# Patient Record
Sex: FEMALE | Race: WHITE | Employment: UNEMPLOYED | URBAN - METROPOLITAN AREA
[De-identification: names, ages, dates, MRNs, and addresses within clinical notes are randomized per-mention and may not be internally consistent; named-entity substitution may affect disease eponyms.]

---

## 2019-05-27 ENCOUNTER — HOSPITAL ENCOUNTER (INPATIENT)
Age: 54
LOS: 4 days | Discharge: HOME | DRG: 201 | End: 2019-05-31
Attending: EMERGENCY MEDICINE | Admitting: INTERNAL MEDICINE
Payer: MEDICAID

## 2019-05-27 ENCOUNTER — APPOINTMENT (OUTPATIENT)
Dept: CT IMAGING | Age: 54
DRG: 201 | End: 2019-05-27
Payer: MEDICAID

## 2019-05-27 ENCOUNTER — APPOINTMENT (OUTPATIENT)
Dept: GENERAL RADIOLOGY | Age: 54
DRG: 201 | End: 2019-05-27
Payer: MEDICAID

## 2019-05-27 DIAGNOSIS — R55 NEAR SYNCOPE: ICD-10-CM

## 2019-05-27 DIAGNOSIS — K29.00 ACUTE SUPERFICIAL GASTRITIS WITHOUT HEMORRHAGE: ICD-10-CM

## 2019-05-27 DIAGNOSIS — I48.91 ATRIAL FIBRILLATION WITH RAPID VENTRICULAR RESPONSE (HCC): ICD-10-CM

## 2019-05-27 DIAGNOSIS — I48.91 NEW ONSET A-FIB (HCC): Primary | ICD-10-CM

## 2019-05-27 LAB
A/G RATIO: 1 (ref 1.1–2.2)
ALBUMIN SERPL-MCNC: 3.8 G/DL (ref 3.4–5)
ALP BLD-CCNC: 109 U/L (ref 40–129)
ALT SERPL-CCNC: 22 U/L (ref 10–40)
ANION GAP SERPL CALCULATED.3IONS-SCNC: 15 MMOL/L (ref 3–16)
AST SERPL-CCNC: 17 U/L (ref 15–37)
BACTERIA: ABNORMAL /HPF
BASOPHILS ABSOLUTE: 0.1 K/UL (ref 0–0.2)
BASOPHILS RELATIVE PERCENT: 0.4 %
BILIRUB SERPL-MCNC: 0.8 MG/DL (ref 0–1)
BILIRUBIN URINE: NEGATIVE
BLOOD, URINE: NEGATIVE
BUN BLDV-MCNC: 16 MG/DL (ref 7–20)
CALCIUM SERPL-MCNC: 10 MG/DL (ref 8.3–10.6)
CHLORIDE BLD-SCNC: 95 MMOL/L (ref 99–110)
CLARITY: CLEAR
CO2: 23 MMOL/L (ref 21–32)
COLOR: YELLOW
CREAT SERPL-MCNC: <0.5 MG/DL (ref 0.6–1.1)
EKG ATRIAL RATE: 138 BPM
EKG DIAGNOSIS: NORMAL
EKG Q-T INTERVAL: 286 MS
EKG QRS DURATION: 76 MS
EKG QTC CALCULATION (BAZETT): 438 MS
EKG R AXIS: 75 DEGREES
EKG T AXIS: 81 DEGREES
EKG VENTRICULAR RATE: 141 BPM
EOSINOPHILS ABSOLUTE: 0 K/UL (ref 0–0.6)
EOSINOPHILS RELATIVE PERCENT: 0.1 %
EPITHELIAL CELLS, UA: ABNORMAL /HPF
GFR AFRICAN AMERICAN: >60
GFR NON-AFRICAN AMERICAN: >60
GLOBULIN: 3.7 G/DL
GLUCOSE BLD-MCNC: 146 MG/DL (ref 70–99)
GLUCOSE URINE: NEGATIVE MG/DL
HCT VFR BLD CALC: 47.5 % (ref 36–48)
HEMOGLOBIN: 16.3 G/DL (ref 12–16)
KETONES, URINE: NEGATIVE MG/DL
LEUKOCYTE ESTERASE, URINE: ABNORMAL
LYMPHOCYTES ABSOLUTE: 2.9 K/UL (ref 1–5.1)
LYMPHOCYTES RELATIVE PERCENT: 20.7 %
MCH RBC QN AUTO: 29.4 PG (ref 26–34)
MCHC RBC AUTO-ENTMCNC: 34.3 G/DL (ref 31–36)
MCV RBC AUTO: 85.6 FL (ref 80–100)
MICROSCOPIC EXAMINATION: YES
MONOCYTES ABSOLUTE: 1.2 K/UL (ref 0–1.3)
MONOCYTES RELATIVE PERCENT: 8.8 %
MUCUS: ABNORMAL /LPF
NEUTROPHILS ABSOLUTE: 9.9 K/UL (ref 1.7–7.7)
NEUTROPHILS RELATIVE PERCENT: 70 %
NITRITE, URINE: NEGATIVE
PDW BLD-RTO: 12.8 % (ref 12.4–15.4)
PH UA: 6.5 (ref 5–8)
PLATELET # BLD: 225 K/UL (ref 135–450)
PMV BLD AUTO: 10.1 FL (ref 5–10.5)
POTASSIUM REFLEX MAGNESIUM: 3.8 MMOL/L (ref 3.5–5.1)
PROTEIN UA: NEGATIVE MG/DL
RBC # BLD: 5.55 M/UL (ref 4–5.2)
RBC UA: ABNORMAL /HPF (ref 0–2)
SODIUM BLD-SCNC: 133 MMOL/L (ref 136–145)
SPECIFIC GRAVITY UA: <=1.005 (ref 1–1.03)
TOTAL PROTEIN: 7.5 G/DL (ref 6.4–8.2)
TROPONIN: <0.01 NG/ML
URINE REFLEX TO CULTURE: YES
URINE TYPE: ABNORMAL
UROBILINOGEN, URINE: 0.2 E.U./DL
WBC # BLD: 14.1 K/UL (ref 4–11)
WBC UA: ABNORMAL /HPF (ref 0–5)

## 2019-05-27 PROCEDURE — 93005 ELECTROCARDIOGRAM TRACING: CPT | Performed by: EMERGENCY MEDICINE

## 2019-05-27 PROCEDURE — 93005 ELECTROCARDIOGRAM TRACING: CPT | Performed by: NURSE PRACTITIONER

## 2019-05-27 PROCEDURE — 81001 URINALYSIS AUTO W/SCOPE: CPT

## 2019-05-27 PROCEDURE — 96372 THER/PROPH/DIAG INJ SC/IM: CPT

## 2019-05-27 PROCEDURE — 6360000002 HC RX W HCPCS: Performed by: EMERGENCY MEDICINE

## 2019-05-27 PROCEDURE — 6360000002 HC RX W HCPCS

## 2019-05-27 PROCEDURE — 85025 COMPLETE CBC W/AUTO DIFF WBC: CPT

## 2019-05-27 PROCEDURE — 6360000002 HC RX W HCPCS: Performed by: NURSE PRACTITIONER

## 2019-05-27 PROCEDURE — 96365 THER/PROPH/DIAG IV INF INIT: CPT

## 2019-05-27 PROCEDURE — 96376 TX/PRO/DX INJ SAME DRUG ADON: CPT

## 2019-05-27 PROCEDURE — 2580000003 HC RX 258: Performed by: INTERNAL MEDICINE

## 2019-05-27 PROCEDURE — 99285 EMERGENCY DEPT VISIT HI MDM: CPT

## 2019-05-27 PROCEDURE — 93010 ELECTROCARDIOGRAM REPORT: CPT | Performed by: INTERNAL MEDICINE

## 2019-05-27 PROCEDURE — 2500000003 HC RX 250 WO HCPCS: Performed by: EMERGENCY MEDICINE

## 2019-05-27 PROCEDURE — 87086 URINE CULTURE/COLONY COUNT: CPT

## 2019-05-27 PROCEDURE — 80053 COMPREHEN METABOLIC PANEL: CPT

## 2019-05-27 PROCEDURE — 6360000002 HC RX W HCPCS: Performed by: INTERNAL MEDICINE

## 2019-05-27 PROCEDURE — 71046 X-RAY EXAM CHEST 2 VIEWS: CPT

## 2019-05-27 PROCEDURE — 74177 CT ABD & PELVIS W/CONTRAST: CPT

## 2019-05-27 PROCEDURE — 96375 TX/PRO/DX INJ NEW DRUG ADDON: CPT

## 2019-05-27 PROCEDURE — 2500000003 HC RX 250 WO HCPCS

## 2019-05-27 PROCEDURE — 6360000004 HC RX CONTRAST MEDICATION: Performed by: NURSE PRACTITIONER

## 2019-05-27 PROCEDURE — 84484 ASSAY OF TROPONIN QUANT: CPT

## 2019-05-27 PROCEDURE — 1200000000 HC SEMI PRIVATE

## 2019-05-27 PROCEDURE — 96361 HYDRATE IV INFUSION ADD-ON: CPT

## 2019-05-27 PROCEDURE — 2580000003 HC RX 258: Performed by: NURSE PRACTITIONER

## 2019-05-27 RX ORDER — ONDANSETRON 2 MG/ML
4 INJECTION INTRAMUSCULAR; INTRAVENOUS ONCE
Status: COMPLETED | OUTPATIENT
Start: 2019-05-27 | End: 2019-05-27

## 2019-05-27 RX ORDER — SODIUM CHLORIDE 0.9 % (FLUSH) 0.9 %
10 SYRINGE (ML) INJECTION EVERY 12 HOURS SCHEDULED
Status: DISCONTINUED | OUTPATIENT
Start: 2019-05-27 | End: 2019-05-31 | Stop reason: HOSPADM

## 2019-05-27 RX ORDER — OXCARBAZEPINE 150 MG/1
400 TABLET, FILM COATED ORAL DAILY
Status: DISCONTINUED | OUTPATIENT
Start: 2019-05-28 | End: 2019-05-31 | Stop reason: HOSPADM

## 2019-05-27 RX ORDER — ALPRAZOLAM 0.25 MG/1
0.5 TABLET ORAL NIGHTLY PRN
Status: DISCONTINUED | OUTPATIENT
Start: 2019-05-28 | End: 2019-05-31 | Stop reason: HOSPADM

## 2019-05-27 RX ORDER — SODIUM CHLORIDE 9 MG/ML
INJECTION, SOLUTION INTRAVENOUS CONTINUOUS
Status: DISCONTINUED | OUTPATIENT
Start: 2019-05-27 | End: 2019-05-31 | Stop reason: HOSPADM

## 2019-05-27 RX ORDER — PROMETHAZINE HYDROCHLORIDE 25 MG/ML
6.25 INJECTION, SOLUTION INTRAMUSCULAR; INTRAVENOUS EVERY 6 HOURS PRN
Status: DISCONTINUED | OUTPATIENT
Start: 2019-05-27 | End: 2019-05-31 | Stop reason: HOSPADM

## 2019-05-27 RX ORDER — CITALOPRAM 20 MG/1
20 TABLET ORAL DAILY
Status: DISCONTINUED | OUTPATIENT
Start: 2019-05-28 | End: 2019-05-31 | Stop reason: HOSPADM

## 2019-05-27 RX ORDER — 0.9 % SODIUM CHLORIDE 0.9 %
1000 INTRAVENOUS SOLUTION INTRAVENOUS ONCE
Status: COMPLETED | OUTPATIENT
Start: 2019-05-27 | End: 2019-05-27

## 2019-05-27 RX ORDER — MORPHINE SULFATE 4 MG/ML
4 INJECTION, SOLUTION INTRAMUSCULAR; INTRAVENOUS ONCE
Status: COMPLETED | OUTPATIENT
Start: 2019-05-27 | End: 2019-05-27

## 2019-05-27 RX ORDER — DEXTROAMPHETAMINE SACCHARATE, AMPHETAMINE ASPARTATE, DEXTROAMPHETAMINE SULFATE AND AMPHETAMINE SULFATE 2.5; 2.5; 2.5; 2.5 MG/1; MG/1; MG/1; MG/1
20 TABLET ORAL DAILY
Status: DISCONTINUED | OUTPATIENT
Start: 2019-05-28 | End: 2019-05-30

## 2019-05-27 RX ORDER — 0.9 % SODIUM CHLORIDE 0.9 %
1000 INTRAVENOUS SOLUTION INTRAVENOUS ONCE
Status: COMPLETED | OUTPATIENT
Start: 2019-05-27 | End: 2019-05-28

## 2019-05-27 RX ORDER — ONDANSETRON 2 MG/ML
INJECTION INTRAMUSCULAR; INTRAVENOUS
Status: COMPLETED
Start: 2019-05-27 | End: 2019-05-27

## 2019-05-27 RX ORDER — ONDANSETRON 2 MG/ML
4 INJECTION INTRAMUSCULAR; INTRAVENOUS EVERY 6 HOURS PRN
Status: COMPLETED | OUTPATIENT
Start: 2019-05-27 | End: 2019-05-27

## 2019-05-27 RX ORDER — DILTIAZEM HYDROCHLORIDE 5 MG/ML
10 INJECTION INTRAVENOUS ONCE
Status: COMPLETED | OUTPATIENT
Start: 2019-05-27 | End: 2019-05-27

## 2019-05-27 RX ORDER — MORPHINE SULFATE 4 MG/ML
4 INJECTION, SOLUTION INTRAMUSCULAR; INTRAVENOUS EVERY 4 HOURS PRN
Status: DISCONTINUED | OUTPATIENT
Start: 2019-05-27 | End: 2019-05-31 | Stop reason: HOSPADM

## 2019-05-27 RX ORDER — SODIUM CHLORIDE 0.9 % (FLUSH) 0.9 %
10 SYRINGE (ML) INJECTION PRN
Status: DISCONTINUED | OUTPATIENT
Start: 2019-05-27 | End: 2019-05-31 | Stop reason: HOSPADM

## 2019-05-27 RX ORDER — ACETAMINOPHEN 325 MG/1
650 TABLET ORAL EVERY 4 HOURS PRN
Status: DISCONTINUED | OUTPATIENT
Start: 2019-05-27 | End: 2019-05-31 | Stop reason: HOSPADM

## 2019-05-27 RX ADMIN — MORPHINE SULFATE 4 MG: 4 INJECTION INTRAVENOUS at 16:48

## 2019-05-27 RX ADMIN — MORPHINE SULFATE 4 MG: 4 INJECTION INTRAVENOUS at 23:43

## 2019-05-27 RX ADMIN — ONDANSETRON 4 MG: 2 INJECTION INTRAMUSCULAR; INTRAVENOUS at 16:48

## 2019-05-27 RX ADMIN — ENOXAPARIN SODIUM 50 MG: 60 INJECTION SUBCUTANEOUS at 21:27

## 2019-05-27 RX ADMIN — Medication 10 ML: at 23:49

## 2019-05-27 RX ADMIN — ONDANSETRON 4 MG: 2 INJECTION INTRAMUSCULAR; INTRAVENOUS at 21:48

## 2019-05-27 RX ADMIN — SODIUM CHLORIDE 1000 ML: 9 INJECTION, SOLUTION INTRAVENOUS at 16:48

## 2019-05-27 RX ADMIN — SODIUM CHLORIDE 1000 ML: 9 INJECTION, SOLUTION INTRAVENOUS at 23:46

## 2019-05-27 RX ADMIN — SODIUM CHLORIDE 1000 ML: 9 INJECTION, SOLUTION INTRAVENOUS at 18:01

## 2019-05-27 RX ADMIN — IOPAMIDOL 75 ML: 755 INJECTION, SOLUTION INTRAVENOUS at 17:09

## 2019-05-27 RX ADMIN — DILTIAZEM HYDROCHLORIDE 10 MG: 5 INJECTION INTRAVENOUS at 21:27

## 2019-05-27 RX ADMIN — ONDANSETRON 4 MG: 2 INJECTION INTRAMUSCULAR; INTRAVENOUS at 23:42

## 2019-05-27 RX ADMIN — CEFTRIAXONE SODIUM 1 G: 1 INJECTION, POWDER, FOR SOLUTION INTRAMUSCULAR; INTRAVENOUS at 19:02

## 2019-05-27 RX ADMIN — FAMOTIDINE 20 MG: 10 INJECTION INTRAVENOUS at 21:48

## 2019-05-27 ASSESSMENT — PAIN SCALES - GENERAL
PAINLEVEL_OUTOF10: 10
PAINLEVEL_OUTOF10: 9
PAINLEVEL_OUTOF10: 8
PAINLEVEL_OUTOF10: 7
PAINLEVEL_OUTOF10: 9

## 2019-05-27 ASSESSMENT — PAIN DESCRIPTION - PAIN TYPE
TYPE: ACUTE PAIN
TYPE: ACUTE PAIN

## 2019-05-27 ASSESSMENT — PAIN DESCRIPTION - LOCATION
LOCATION: ABDOMEN
LOCATION: ABDOMEN

## 2019-05-27 NOTE — ED NOTES
Patient ambulate in ed hallway. She walk just fine. She said she feels a little light head.       Karly Negrete  05/27/19 1952

## 2019-05-27 NOTE — ED PROVIDER NOTES
[facility]  EMERGENCY DEPARTMENT ENCOUNTER        Pt Name: Angela Rosa  MRN: [id number]  [place] 1965  Date of evaluation: 5/27/2019  Provider: ANALIA Peña CNP  PCP: No primary care provider on file. This patient was seen and evaluated by the attending physician Dr. Pernell Urbina       Chief Complaint   Patient presents with    Loss of Consciousness     Pt states she passed out today hasnt been feelig well not eating x 3 days        HISTORY OF PRESENT ILLNESS   (Location/Symptom, Timing/Onset, Context/Setting, Quality, Duration, Modifying Factors, Severity)  Note limiting factors. Angela Rosa is a 46 y.o. female who has not felt well for the past 3-5 days. She states that on Friday of last week she vomited for nine hours straight. She had three episode of diarrhea today, but denies any blood in her stool. She states she has not eaten in three days and just feels weak. She denies any urinary symptoms. She denies any fever, but has had chills. She is having abdominal pain all over, feels like every muscle \"in my stomach has been pulled out\". She denies taking any medications for her symptoms. she is also complaining of generalized body aches. she is mostly complaining of diffuse abdominal pain and tenderness, rates the pain a 10 out of 10. Denies any additional complaints. Denies taking any medicine for her symptoms. No additional aggravating or alleviating factors. The patient presents awake, alert and in no acute respiratory distress or toxic appearance however, she does appear to be weak and fatigued. Nursing Notes were all reviewed and agreed with or any disagreements were addressed  in the HPI. REVIEW OF SYSTEMS    (2-9 systems for level 4, 10 or more for level 5)     Review of Systems   Constitutional: Negative for chills and fever. HENT: Negative for congestion.     Respiratory: Negative for cough, shortness of breath and wheezing. Cardiovascular: Negative for chest pain. Gastrointestinal: Positive for abdominal pain, diarrhea, nausea and vomiting. Patient whether generalized abdominal cramping associated with nausea vomiting or diarrhea, no blood in stool. Genitourinary: Negative for difficulty urinating, dysuria and frequency. Musculoskeletal: Positive for myalgias. Negative for back pain. Skin: Negative for color change. Neurological: Negative for weakness, numbness and headaches. Positives and Pertinent negatives as per HPI. Except as noted abovein the ROS, all other systems were reviewed and negative. PAST MEDICAL HISTORY     Past Medical History:   Diagnosis Date    Depression          SURGICAL HISTORY     Past Surgical History:   Procedure Laterality Date    ANKLE SURGERY      ANKLE SURGERY      MOUTH SURGERY      WISDOM TOOTH EXTRACTION           [address]       Current Discharge Medication List      CONTINUE these medications which have NOT CHANGED    Details   amphetamine-dextroamphetamine (ADDERALL) 20 MG tablet Take 20 mg by mouth daily      citalopram (CELEXA) 20 MG tablet Take 20 mg by mouth daily      ALPRAZolam (XANAX) 0.5 MG tablet Take 0.5 mg by mouth nightly as needed for Sleep      OXcarbazepine (TRILEPTAL) 150 MG tablet Take 400 mg by mouth daily                ALLERGIES     Aspirin; Codeine; Penicillins; and Poultry meal    FAMILYHISTORY     History reviewed. No pertinent family history.        SOCIAL HISTORY       Social History     Socioeconomic History    Marital status:      Spouse name: None    Number of children: None    Years of education: None    Highest education level: None   Occupational History    None   Social Needs    Financial resource strain: None    Food insecurity:     Worry: None     Inability: None    Transportation needs:     Medical: None     Non-medical: None   Tobacco Use    Smoking status: Current Every Day Smoker Packs/day: 0.50     Types: Cigarettes    Smokeless tobacco: Never Used   Substance and Sexual Activity    Alcohol use: Yes     Comment: occ    Drug use: Yes     Types: Marijuana    Sexual activity: None   Lifestyle    Physical activity:     Days per week: None     Minutes per session: None    Stress: None   Relationships    Social connections:     Talks on phone: None     Gets together: None     Attends Church service: None     Active member of club or organization: None     Attends meetings of clubs or organizations: None     Relationship status: None    Intimate partner violence:     Fear of current or ex partner: None     Emotionally abused: None     Physically abused: None     Forced sexual activity: None   Other Topics Concern    None   Social History Narrative    None       SCREENINGS    [place] Coma Scale  Eye Opening: Spontaneous  Best Verbal Response: Oriented  Best Motor Response: Obeys commands  [place] Coma Scale Score: 15        PHYSICAL EXAM    (up to 7 for level 4, 8 or more for level 5)     ED Triage Vitals [05/27/19 1534]   BP Temp Temp src Pulse Resp SpO2 Height Weight   (!) 125/96 98 °F (36.7 °C) -- 129 15 99 % 5' 1\" (1.549 m) 100 lb (45.4 kg)       Physical Exam   Constitutional: She is oriented to person, place, and time. She appears well-developed and well-nourished. HENT:   Head: Normocephalic. Right Ear: External ear normal.   Left Ear: External ear normal.   Mouth/Throat: Oropharynx is clear and moist.   Eyes: Right eye exhibits no discharge. Left eye exhibits no discharge. No scleral icterus. Neck: Normal range of motion. Neck supple. Cardiovascular:   Normal S1 and 2, peripheral pulses 2+, initially tachycardic   Pulmonary/Chest: Effort normal. No respiratory distress. Airway patent with symmetric rise and fall chest: Clear anteriorly, diminished posteriorly in the bases.   The patient is not tachypneic or dyspneic and saturations are 99% on room air   Abdominal: Soft. Bowel sounds are normal. There is no tenderness. Musculoskeletal: Normal range of motion. Neurological: She is alert and oriented to person, place, and time. GCS eye subscore is 4. GCS verbal subscore is 5. GCS motor subscore is 6. Patient is awake, alert following commands correctly. Neurologically intact no focal deficits. No numbness, tingling of paresthesias. Follows all commands correctly. Skin: Skin is warm. Capillary refill takes less than 2 seconds. She is not diaphoretic. No pallor. Psychiatric: She has a normal mood and affect. Her behavior is normal.   Nursing note and vitals reviewed.       DIAGNOSTIC RESULTS   LABS:    Labs Reviewed   CBC WITH AUTO DIFFERENTIAL - Abnormal; Notable for the following components:       Result Value    WBC 14.1 (*)     RBC 5.55 (*)     Hemoglobin 16.3 (*)     Neutrophils # 9.9 (*)     All other components within normal limits    Narrative:     Performed at:  [place] [address] [address]   Phone [phone]   COMPREHENSIVE METABOLIC PANEL W/ REFLEX TO MG FOR LOW K - Abnormal; Notable for the following components:    Sodium 133 (*)     Chloride 95 (*)     Glucose 146 (*)     CREATININE <0.5 (*)     Albumin/Globulin Ratio 1.0 (*)     All other components within normal limits    Narrative:     Performed at:  [place] [address] [address]   Phone [phone]   URINE RT REFLEX TO CULTURE - Abnormal; Notable for the following components:    Leukocyte Esterase, Urine TRACE (*)     All other components within normal limits    Narrative:     Performed at:  [place] [address] [address]   Phone [phone]   MICROSCOPIC URINALYSIS - Abnormal; Notable for the following components:    Mucus, UA 1+ (*)     WBC, UA 6-10 (*)     Bacteria, UA 1+ (*)     All other components within normal limits Narrative:     Performed at:  [facility]  [address] [facility] [employer] [place]   Phone [phone]   URINE CULTURE   TROPONIN    Narrative:     Performed at:  [facility]  [address] [facility] [place]   Phone [phone]   URINALYSIS   MAGNESIUM   PHOSPHORUS   TSH WITH REFLEX   BASIC METABOLIC PANEL W/ REFLEX TO MG FOR LOW K   CBC   LIPID PANEL       All other labs were within normal range or not returned as of this dictation. EKG: All EKG's are interpreted by the Emergency Department Physician who either signs orCo-signs this chart in the absence of a cardiologist.  Please see their note for interpretation of EKG. RADIOLOGY:   Non-plain film images such as CT, Ultrasound and MRI are read by the radiologist. Plain radiographic images are visualized andpreliminarily interpreted by the  ED Provider with the below findings:        Interpretation perthe Radiologist below, if available at the time of this note:    CT ABDOMEN PELVIS W IV CONTRAST Additional Contrast? None   Final Result   Marked mural thickening of the gastric body compatible with gastritis. Definite ulceration not identified, but that can be difficult to visualized   with CT technique. Consider direct visualization. There is fluid-filled distention of small bowel in the left mid upper   abdomen, with evidence of mucosal hyperenhancement. Consequently, component   of enteritis is also considered. There is poor visualization of the proximal celiac and superior mesenteric   arteries, which is probably technical in nature. Correlate with any clinical   evidence acute and/or chronic mesenteric ischemia. Consider further   evaluation with CT angiography to better evaluate those and to evaluate for   the possibility of any stenosis. XR CHEST STANDARD (2 VW)   Final Result   No acute process. No results found.        PROCEDURES   Unless iopamidol (ISOVUE-370) 76 % injection 75 mL (75 mLs Intravenous Given 5/27/19 1709)   0.9 % sodium chloride bolus (0 mLs Intravenous Stopped 5/27/19 2027)   cefTRIAXone (ROCEPHIN) 1 g IVPB in 50 mL D5W minibag (0 g Intravenous Stopped 5/27/19 2027)   diltiazem injection 10 mg (10 mg Intravenous Given 5/27/19 2127)   enoxaparin (LOVENOX) injection 50 mg (50 mg Subcutaneous Given 5/27/19 2127)   ondansetron (ZOFRAN) injection 4 mg (4 mg Intravenous Given 5/27/19 2148)   famotidine (PEPCID) injection 20 mg (20 mg Intravenous Given 5/27/19 2148)   ondansetron (ZOFRAN) injection 4 mg (4 mg Intravenous Given 5/27/19 2342)   Patient has not felt well for the past 3-5 days. She states that on Friday of last week she vomited for nine hours straight. She had three episode of diarrhea today, but denies any blood in her stool. She states she has not eaten in three days and just feels weak. She denies any urinary symptoms. She denies any fever, but has had chills. She is having abdominal pain all over, feels like every muscle \"in my stomach has been pulled out\". She denies taking any medications for her symptoms. After evaluation and examination patient I ordered IV access, IV fluids, blood work, chest x-ray, CT of the abdomen, pain and nausea medicine. Urinalysis shows 6-10 WBCs, appears to be a contaminated sample as she is 5-10 epithelial cells. Metabolic panel shows a leukocytosis with a white count of 14,100, no significant anemia. Metabolic panel shows no significant electrolyte disturbances or renal insufficiency. Troponin is negative. I did have the patient walk with the nursing staff initiated later well, orthostatic vital signs were also obtained,and signs of orthostatic hypotension. CT scan abdomen shows markedly mural thickening of the gastric body compatible with gastritis, a definitive ulceration not identified that can be difficult with CT.   I did give him a dose of Rocephin concerning for any white

## 2019-05-28 PROBLEM — E05.90 HYPERTHYROIDISM: Status: ACTIVE | Noted: 2019-05-28

## 2019-05-28 PROBLEM — E43 SEVERE MALNUTRITION (HCC): Chronic | Status: ACTIVE | Noted: 2019-05-28

## 2019-05-28 PROBLEM — I48.0 PAROXYSMAL ATRIAL FIBRILLATION (HCC): Status: ACTIVE | Noted: 2019-05-27

## 2019-05-28 LAB
ANION GAP SERPL CALCULATED.3IONS-SCNC: 8 MMOL/L (ref 3–16)
BUN BLDV-MCNC: 8 MG/DL (ref 7–20)
CALCIUM SERPL-MCNC: 8.3 MG/DL (ref 8.3–10.6)
CHLORIDE BLD-SCNC: 105 MMOL/L (ref 99–110)
CHOLESTEROL, TOTAL: 95 MG/DL (ref 0–199)
CO2: 24 MMOL/L (ref 21–32)
CREAT SERPL-MCNC: <0.5 MG/DL (ref 0.6–1.1)
EKG ATRIAL RATE: 441 BPM
EKG ATRIAL RATE: 86 BPM
EKG DIAGNOSIS: NORMAL
EKG DIAGNOSIS: NORMAL
EKG Q-T INTERVAL: 308 MS
EKG Q-T INTERVAL: 350 MS
EKG QRS DURATION: 78 MS
EKG QRS DURATION: 78 MS
EKG QTC CALCULATION (BAZETT): 424 MS
EKG QTC CALCULATION (BAZETT): 432 MS
EKG R AXIS: 70 DEGREES
EKG R AXIS: 75 DEGREES
EKG T AXIS: 67 DEGREES
EKG T AXIS: 84 DEGREES
EKG VENTRICULAR RATE: 114 BPM
EKG VENTRICULAR RATE: 92 BPM
GFR AFRICAN AMERICAN: >60
GFR NON-AFRICAN AMERICAN: >60
GLUCOSE BLD-MCNC: 106 MG/DL (ref 70–99)
HCG QUALITATIVE: NEGATIVE
HCT VFR BLD CALC: 36.3 % (ref 36–48)
HDLC SERPL-MCNC: 46 MG/DL (ref 40–60)
HEMOGLOBIN: 12.2 G/DL (ref 12–16)
LDL CHOLESTEROL CALCULATED: 38 MG/DL
MAGNESIUM: 1.5 MG/DL (ref 1.8–2.4)
MCH RBC QN AUTO: 28.6 PG (ref 26–34)
MCHC RBC AUTO-ENTMCNC: 33.6 G/DL (ref 31–36)
MCV RBC AUTO: 85.3 FL (ref 80–100)
PDW BLD-RTO: 12.7 % (ref 12.4–15.4)
PHOSPHORUS: 3.6 MG/DL (ref 2.5–4.9)
PLATELET # BLD: 154 K/UL (ref 135–450)
PMV BLD AUTO: 9.6 FL (ref 5–10.5)
POTASSIUM REFLEX MAGNESIUM: 3.2 MMOL/L (ref 3.5–5.1)
RBC # BLD: 4.26 M/UL (ref 4–5.2)
SODIUM BLD-SCNC: 137 MMOL/L (ref 136–145)
T4 FREE: 4.3 NG/DL (ref 0.9–1.8)
TRIGL SERPL-MCNC: 53 MG/DL (ref 0–150)
TSH REFLEX: <0.01 UIU/ML (ref 0.27–4.2)
VLDLC SERPL CALC-MCNC: 11 MG/DL
WBC # BLD: 11.4 K/UL (ref 4–11)

## 2019-05-28 PROCEDURE — 2500000003 HC RX 250 WO HCPCS: Performed by: INTERNAL MEDICINE

## 2019-05-28 PROCEDURE — 80061 LIPID PANEL: CPT

## 2019-05-28 PROCEDURE — 6360000002 HC RX W HCPCS: Performed by: INTERNAL MEDICINE

## 2019-05-28 PROCEDURE — 85027 COMPLETE CBC AUTOMATED: CPT

## 2019-05-28 PROCEDURE — 80048 BASIC METABOLIC PNL TOTAL CA: CPT

## 2019-05-28 PROCEDURE — 6370000000 HC RX 637 (ALT 250 FOR IP): Performed by: INTERNAL MEDICINE

## 2019-05-28 PROCEDURE — 93010 ELECTROCARDIOGRAM REPORT: CPT | Performed by: INTERNAL MEDICINE

## 2019-05-28 PROCEDURE — 93005 ELECTROCARDIOGRAM TRACING: CPT | Performed by: INTERNAL MEDICINE

## 2019-05-28 PROCEDURE — 2580000003 HC RX 258: Performed by: INTERNAL MEDICINE

## 2019-05-28 PROCEDURE — 93306 TTE W/DOPPLER COMPLETE: CPT

## 2019-05-28 PROCEDURE — 6360000002 HC RX W HCPCS: Performed by: NURSE PRACTITIONER

## 2019-05-28 PROCEDURE — 84439 ASSAY OF FREE THYROXINE: CPT

## 2019-05-28 PROCEDURE — 83735 ASSAY OF MAGNESIUM: CPT

## 2019-05-28 PROCEDURE — 84100 ASSAY OF PHOSPHORUS: CPT

## 2019-05-28 PROCEDURE — 1200000000 HC SEMI PRIVATE

## 2019-05-28 PROCEDURE — 84703 CHORIONIC GONADOTROPIN ASSAY: CPT

## 2019-05-28 PROCEDURE — 99254 IP/OBS CNSLTJ NEW/EST MOD 60: CPT | Performed by: INTERNAL MEDICINE

## 2019-05-28 PROCEDURE — 84443 ASSAY THYROID STIM HORMONE: CPT

## 2019-05-28 PROCEDURE — 36415 COLL VENOUS BLD VENIPUNCTURE: CPT

## 2019-05-28 PROCEDURE — 6370000000 HC RX 637 (ALT 250 FOR IP): Performed by: NURSE PRACTITIONER

## 2019-05-28 RX ORDER — POTASSIUM CHLORIDE 20 MEQ/1
40 TABLET, EXTENDED RELEASE ORAL
Status: COMPLETED | OUTPATIENT
Start: 2019-05-28 | End: 2019-05-28

## 2019-05-28 RX ORDER — PROPRANOLOL HYDROCHLORIDE 10 MG/1
20 TABLET ORAL 3 TIMES DAILY
Status: DISCONTINUED | OUTPATIENT
Start: 2019-05-28 | End: 2019-05-29

## 2019-05-28 RX ORDER — NICOTINE 21 MG/24HR
1 PATCH, TRANSDERMAL 24 HOURS TRANSDERMAL DAILY
Status: DISCONTINUED | OUTPATIENT
Start: 2019-05-28 | End: 2019-05-31 | Stop reason: HOSPADM

## 2019-05-28 RX ORDER — METHIMAZOLE 5 MG/1
5 TABLET ORAL 3 TIMES DAILY
Status: DISCONTINUED | OUTPATIENT
Start: 2019-05-28 | End: 2019-05-31 | Stop reason: HOSPADM

## 2019-05-28 RX ORDER — DILTIAZEM HYDROCHLORIDE 5 MG/ML
10 INJECTION INTRAVENOUS ONCE
Status: DISCONTINUED | OUTPATIENT
Start: 2019-05-28 | End: 2019-05-28

## 2019-05-28 RX ORDER — DILTIAZEM HYDROCHLORIDE 60 MG/1
30 TABLET, FILM COATED ORAL EVERY 6 HOURS SCHEDULED
Status: DISCONTINUED | OUTPATIENT
Start: 2019-05-28 | End: 2019-05-28

## 2019-05-28 RX ORDER — MAGNESIUM SULFATE IN WATER 40 MG/ML
2 INJECTION, SOLUTION INTRAVENOUS ONCE
Status: COMPLETED | OUTPATIENT
Start: 2019-05-28 | End: 2019-05-28

## 2019-05-28 RX ORDER — POTASSIUM CHLORIDE 20 MEQ/1
40 TABLET, EXTENDED RELEASE ORAL
Status: DISCONTINUED | OUTPATIENT
Start: 2019-05-28 | End: 2019-05-28

## 2019-05-28 RX ORDER — DIGOXIN 0.25 MG/ML
250 INJECTION INTRAMUSCULAR; INTRAVENOUS ONCE
Status: COMPLETED | OUTPATIENT
Start: 2019-05-28 | End: 2019-05-28

## 2019-05-28 RX ADMIN — MORPHINE SULFATE 4 MG: 4 INJECTION INTRAVENOUS at 16:21

## 2019-05-28 RX ADMIN — DILTIAZEM HYDROCHLORIDE 30 MG: 60 TABLET, FILM COATED ORAL at 11:16

## 2019-05-28 RX ADMIN — FAMOTIDINE 20 MG: 10 INJECTION, SOLUTION INTRAVENOUS at 08:50

## 2019-05-28 RX ADMIN — OXCARBAZEPINE 375 MG: 150 TABLET ORAL at 20:47

## 2019-05-28 RX ADMIN — CITALOPRAM HYDROBROMIDE 20 MG: 20 TABLET ORAL at 20:48

## 2019-05-28 RX ADMIN — ENOXAPARIN SODIUM 40 MG: 40 INJECTION SUBCUTANEOUS at 20:48

## 2019-05-28 RX ADMIN — SODIUM CHLORIDE: 9 INJECTION, SOLUTION INTRAVENOUS at 20:44

## 2019-05-28 RX ADMIN — DEXTROAMPHETAMINE SACCHARATE, AMPHETAMINE ASPARTATE, DEXTROAMPHETAMINE SULFATE AND AMPHETAMINE SULFATE 20 MG: 2.5; 2.5; 2.5; 2.5 TABLET ORAL at 08:49

## 2019-05-28 RX ADMIN — DIGOXIN 250 MCG: 0.25 INJECTION INTRAMUSCULAR; INTRAVENOUS at 02:58

## 2019-05-28 RX ADMIN — POTASSIUM CHLORIDE 40 MEQ: 20 TABLET, EXTENDED RELEASE ORAL at 08:49

## 2019-05-28 RX ADMIN — Medication 10 ML: at 08:50

## 2019-05-28 RX ADMIN — PROPRANOLOL HYDROCHLORIDE 20 MG: 10 TABLET ORAL at 15:14

## 2019-05-28 RX ADMIN — POTASSIUM CHLORIDE 40 MEQ: 20 TABLET, EXTENDED RELEASE ORAL at 05:56

## 2019-05-28 RX ADMIN — METHIMAZOLE 5 MG: 5 TABLET ORAL at 22:09

## 2019-05-28 RX ADMIN — SODIUM CHLORIDE: 9 INJECTION, SOLUTION INTRAVENOUS at 03:04

## 2019-05-28 RX ADMIN — ACETAMINOPHEN 650 MG: 325 TABLET ORAL at 17:52

## 2019-05-28 RX ADMIN — MORPHINE SULFATE 4 MG: 4 INJECTION INTRAVENOUS at 05:56

## 2019-05-28 RX ADMIN — SODIUM CHLORIDE: 9 INJECTION, SOLUTION INTRAVENOUS at 11:16

## 2019-05-28 RX ADMIN — MAGNESIUM SULFATE HEPTAHYDRATE 2 G: 40 INJECTION, SOLUTION INTRAVENOUS at 03:55

## 2019-05-28 RX ADMIN — ENOXAPARIN SODIUM 40 MG: 40 INJECTION SUBCUTANEOUS at 10:12

## 2019-05-28 ASSESSMENT — ENCOUNTER SYMPTOMS
VOMITING: 1
BACK PAIN: 0
SHORTNESS OF BREATH: 0
WHEEZING: 0
ABDOMINAL PAIN: 1
COLOR CHANGE: 0
DIARRHEA: 1
COUGH: 0
NAUSEA: 1

## 2019-05-28 ASSESSMENT — PAIN SCALES - GENERAL
PAINLEVEL_OUTOF10: 10
PAINLEVEL_OUTOF10: 8
PAINLEVEL_OUTOF10: 9

## 2019-05-28 NOTE — PLAN OF CARE
Nutrition Problem: Severe malnutrition  Intervention: Food and/or Nutrient Delivery: Continue current diet, Start ONS  Nutritional Goals: Pt will have PO intakes of 50% or more this admission

## 2019-05-28 NOTE — PROGRESS NOTES
4 Eyes Skin Assessment     The patient is being assess for  Admission    I agree that 2 RN's have performed a thorough Head to Toe Skin Assessment on the patient. ALL assessment sites listed below have been assessed. Areas assessed by both nurses:   [x]   Head, Face, and Ears   [x]   Shoulders, Back, and Chest  [x]   Arms, Elbows, and Hands   [x]   Coccyx, Sacrum, and Ischum  [x]   Legs, Feet, and Heels        Does the Patient have Skin Breakdown?   No         Robert Prevention initiated:  No   Wound Care Orders initiated:  No      [facility] nurse consulted for Pressure Injury (Stage 3,4, Unstageable, DTI, NWPT, and Complex wounds):  No      Nurse 1 eSignature: Electronically signed by Grupo Stone RN on 5/28/19 at 5:03 AM    **SHARE this note so that the co-signing nurse is able to place an eSignature**    Nurse 2 eSignature: {Esignature:[id number]}

## 2019-05-28 NOTE — PROGRESS NOTES
Perfect serve page to cross cover: Pt's HR maintaining in 120s-140s while pt is resting in bed. Fluid bolus is running, did you want anything else ordered for heart rate?  Thanks

## 2019-05-28 NOTE — PROGRESS NOTES
Perfect serve page to cross cover: Pt's potassium is 3.2 this AM and mag is 1.5. Pt is here with new onset afib RVR and nausea/vomiting. Do you want replacements ordered?  Thanks

## 2019-05-28 NOTE — PROGRESS NOTES
Hospitalist Progress Note      PCP: No primary care provider on file. Date of Admission: 5/27/2019    Chief Complaint: weakness, palpitation    Hospital Course: admitted with AF with RVR. No previously known cardiac condition. Cardiology consulted     Subjective: weakness, no chest pain, no shortness of breath at rest. Has shortness of breath on exertion        Medications:  Reviewed    Infusion Medications    sodium chloride 125 mL/hr at 05/28/19 0304     Scheduled Medications    nicotine  1 patch Transdermal Daily    diltiazem  10 mg Intravenous Once    amphetamine-dextroamphetamine  20 mg Oral Daily    citalopram  20 mg Oral Daily    OXcarbazepine  375 mg Oral Daily    sodium chloride flush  10 mL Intravenous 2 times per day     PRN Meds: morphine, promethazine, ALPRAZolam, sodium chloride flush, magnesium hydroxide, acetaminophen      Intake/Output Summary (Last 24 hours) at 5/28/2019 0921  Last data filed at 5/28/2019 0809  Gross per 24 hour   Intake 1050 ml   Output 0 ml   Net 1050 ml       Physical Exam Performed:    /77   Pulse 128   Temp 98 °F (36.7 °C) (Oral)   Resp 16   Ht 5' 1\" (1.549 m)   Wt 84 lb (38.1 kg)   LMP 11/26/2012   SpO2 97%   BMI 15.87 kg/m²     General appearance: No apparent distress, appears stated age and cooperative. HEENT: Pupils equal, round, and reactive to light. Conjunctivae/corneas clear. Neck: Supple, with full range of motion. No jugular venous distention. Trachea midline. Respiratory:  Normal respiratory effort. Clear to auscultation, bilaterally without Rales/Wheezes/Rhonchi. Cardiovascular: Tachycardic, irregularly irregular rhythm with normal S1/S2 without murmurs, rubs or gallops. Abdomen: Soft, non-tender, non-distended with normal bowel sounds. Musculoskeletal: No clubbing, cyanosis or edema bilaterally. Full range of motion without deformity. Skin: Skin color, texture, turgor normal.  No rashes or lesions.   Neurologic: Neurovascularly intact without any focal sensory/motor deficits. Cranial nerves: II-XII intact, grossly non-focal.  Psychiatric: Alert and oriented, thought content appropriate, normal insight  Capillary Refill: Brisk,< 3 seconds   Peripheral Pulses: +2 palpable, equal bilaterally       Labs:   Recent Labs     05/27/19  1544 05/28/19  0304   WBC 14.1* 11.4*   HGB 16.3* 12.2   HCT 47.5 36.3    154     Recent Labs     05/27/19  1544 05/28/19  0304   * 137   K 3.8 3.2*   CL 95* 105   CO2 23 24   BUN 16 8   CREATININE <0.5* <0.5*   CALCIUM 10.0 8.3   PHOS  --  3.6     Recent Labs     05/27/19  1544   AST 17   ALT 22   BILITOT 0.8   ALKPHOS 109     No results for input(s): INR in the last 72 hours. Recent Labs     05/27/19  1544   TROPONINI <0.01       Urinalysis:      Lab Results   Component Value Date    NITRU Negative 05/27/2019    WBCUA 6-10 05/27/2019    BACTERIA 1+ 05/27/2019    RBCUA 0-2 05/27/2019    BLOODU Negative 05/27/2019    SPECGRAV <=1.005 05/27/2019    GLUCOSEU Negative 05/27/2019       Radiology:  CT ABDOMEN PELVIS W IV CONTRAST Additional Contrast? None   Final Result   Marked mural thickening of the gastric body compatible with gastritis. Definite ulceration not identified, but that can be difficult to visualized   with CT technique. Consider direct visualization. There is fluid-filled distention of small bowel in the left mid upper   abdomen, with evidence of mucosal hyperenhancement. Consequently, component   of enteritis is also considered. There is poor visualization of the proximal celiac and superior mesenteric   arteries, which is probably technical in nature. Correlate with any clinical   evidence acute and/or chronic mesenteric ischemia. Consider further   evaluation with CT angiography to better evaluate those and to evaluate for   the possibility of any stenosis. XR CHEST STANDARD (2 VW)   Final Result   No acute process. Assessment/Plan:    Active Hospital Problems    Diagnosis    Hyperthyroidism [E05.90]    Paroxysmal atrial fibrillation (HCC) [I48.0]     PLAN:    Atrial fibrillation with RVR  Started on oral diltiazem pending cardiology evaluation  Echo ordered  TSH ordered and is pending  No previous cardiac issues  Monitor on telemetry  Started therapeutic Lovenox    New diagnosis of hyperthyroidism  Most likely the underlying reason for atrial fibrillation. Low BMI is probably a consequence of this. I will check beta-hCG. If negative, start the patient on antithyroid medications. Underweight with BMI 15.9  Most likely caused by hyperthyroidism. This should improve with treatment over the underlying reason. Abnormal UA  No urinary symptoms  Received a dose of Rocephin in the ED. Will not be continued due to lack of symptoms    Hypokalemia  Monitor and replete as needed    Depression   Continue home management  Appears stable    Tobacco abuse  Cessation advice given    DVT Prophylaxis: Therapeutic Lovenox  Diet: DIET CARDIAC;  Code Status: Full Code    PT/OT Eval Status: not needed.  Baseline functionality    Dispo - inpatient 2-3 days    Derrick Acosta MD

## 2019-05-28 NOTE — PLAN OF CARE
Pt remains free from falls this shift. Bed locked in lowest position, call light within reach, and hourly rounding performed. Will continue to monitor.

## 2019-05-28 NOTE — CARE COORDINATION
CM reviewed pt's chart and notes pt is a 47 yo female admitted with ABD pain and waiting on recs from Cards. Spoke to [facility] and there are no DCP needs identified at this time. If any needs or concerns should arise please notify.

## 2019-05-28 NOTE — H&P
Hospital Medicine History & Physical      PCP: No primary care provider on file. Date of Admission: 5/27/2019    Date of Service: Pt seen/examined on 5/27 and  Placed in Observation. Chief Complaint:   New onset of A-Fib with RVR, improved after  IV Cardizem bolus    History Of Present Illness:   46 y.o. female who presented to [facility]  After she did  not felt well for the few days with developing some abdomen pain/discomfort, several episode of diarrhea ,nausea, vomiting. No RBPR or Hematemesis . She feels weak due to decreased PO intake . No any urinary symptoms, no  fever, but has had chills. Extensive work up done in [place] [address] . CT abd  Indicate for gastritis, however, EKG showed  new onset A-Fib with RVR, improved after Cardizem Bolus and IV NS      Pt will be left for close observation and getting Cardiology consult in AM         Past Medical History:          Diagnosis Date    Depression        Past Surgical History:          Procedure Laterality Date    ANKLE SURGERY      ANKLE SURGERY      MOUTH SURGERY      WISDOM TOOTH EXTRACTION         Medications Prior to Admission:      Prior to Admission medications    Medication Sig Start Date End Date Taking? Authorizing Provider   amphetamine-dextroamphetamine (ADDERALL) 20 MG tablet Take 20 mg by mouth daily   Yes Historical Provider, MD   citalopram (CELEXA) 20 MG tablet Take 20 mg by mouth daily   Yes Historical Provider, MD   ALPRAZolam (XANAX) 0.5 MG tablet Take 0.5 mg by mouth nightly as needed for Sleep   Yes Historical Provider, MD   OXcarbazepine (TRILEPTAL) 150 MG tablet Take 400 mg by mouth daily     Historical Provider, MD       Allergies:  Aspirin; Codeine; and Penicillins    Social History:      The patient currently lives at home    TOBACCO:   reports that she has been smoking cigarettes. She has been smoking about 0.50 packs per day.  She has never used smokeless tobacco.  ETOH:   reports that she drinks alcohol. Family History:       Reviewed in detail and negative for DM, CAD, Cancer, CVA. REVIEW OF SYSTEMS:   All twelve systems reviewed and negative except for noted in HPI. PHYSICAL EXAM PERFORMED:    BP (!) 147/91   Pulse 112   Temp 98 °F (36.7 °C)   Resp 15   Ht 5' 1\" (1.549 m)   Wt 100 lb (45.4 kg)   LMP 11/26/2012   SpO2 99%   BMI 18.89 kg/m²     General appearance:  No apparent distress, appears stated age and cooperative. HEENT:  Normal cephalic, atraumatic without obvious deformity. Pupils equal, round, and reactive to light. Extra ocular muscles intact. Conjunctivae/corneas clear. Neck: Supple, with full range of motion. No jugular venous distention. Trachea midline. Respiratory:  Normal respiratory effort. Clear to auscultation, bilaterally without Rales/Wheezes/Rhonchi. Cardiovascular:   Irregularly irregular, A-Fib on monitore. No Murmurs, S1 S2 present   Abdomen: Mild tenderness on palpation. Mostly in epigastric area, no rebound   Musculoskeletal: No clubbing, cyanosis or edema bilaterally. Full range of motion without deformity. Peripheral Pulses: +2 palpable, equal bilaterally   Skin: Skin color, texture, turgor normal.  No rashes or lesions. Neurologic:  Neurovascularly intact without any focal sensory/motor deficits. Cranial nerves: II-XII intact, grossly non-focal.  Psychiatric:  Alert and oriented, thought content appropriate, normal insight  Capillary Refill: Brisk,< 3 seconds         Labs:     Recent Labs     05/27/19  1544   WBC 14.1*   HGB 16.3*   HCT 47.5        Recent Labs     05/27/19  1544   *   K 3.8   CL 95*   CO2 23   BUN 16   CREATININE <0.5*   CALCIUM 10.0     Recent Labs     05/27/19  1544   AST 17   ALT 22   BILITOT 0.8   ALKPHOS 109     No results for input(s): INR in the last 72 hours.   Recent Labs     05/27/19  1544   TROPONINI <0.01       Urinalysis:      Lab Results   Component Value Date    NITRU Negative 05/27/2019    WBCUA 6-10

## 2019-05-28 NOTE — PROGRESS NOTES
Patient admitted to room 209-1 from ED. Patient oriented to room, call light, bed rails, phone, lights and bathroom. Patient instructed about the schedule of the day including: vital sign frequency, lab draws, possible tests, frequency of MD and staff rounds, including RN/MD rounding together at bedside, daily weights, and I &O's. Patient instructed about prescribed diet, how to use 8MENU, and television. Telemetry box in place, patient aware of placement and reason. Bed locked, in lowest position, side rails up 2/4, call light within reach. Will continue to monitor.

## 2019-05-28 NOTE — PROGRESS NOTES
Needs:  · Estimated Daily Total Kcal: 4339-1676  · Estimated Daily Protein (g): 45-68  · Estimated Daily Total Fluid (ml/day): 1 ml/kcal or per MD    Nutrition Diagnosis:   · Problem: Severe malnutrition  · Etiology: related to Insufficient energy/nutrient consumption     Signs and symptoms:  as evidenced by Nausea, Vomiting, Diarrhea, BMI, Weight loss, Severe loss of subcutaneous fat, Severe muscle loss    Objective Information:  · Nutrition-Focused Physical Findings: Hypoactive BS  · Wound Type: None  · Current Nutrition Therapies:  · Oral Diet Orders: Cardiac   · Oral Diet intake: 0%, 1-25%, %  · Oral Nutrition Supplement (ONS) Orders: None  · Anthropometric Measures:  · Ht: 5' 1\" (154.9 cm)   · Current Body Wt: 84 lb (38.1 kg)(actual)  · Usual Body Wt: 100 lb (45.4 kg)  · % Weight Change:  ,  No weight hx per EMR  · Ideal Body Wt: 105 lb (47.6 kg), % Ideal Body 80%  · BMI Classification: BMI <18.5 Underweight    Nutrition Interventions:   Continue current diet, Start ONS  Continued Inpatient Monitoring    Nutrition Evaluation:   · Evaluation: Goals set   · Goals: Pt will have PO intakes of 50% or more this admission    · Monitoring: Nutrition Progression, Meal Intake, Weight, Supplement Intake, Pertinent Labs, Diet Tolerance, Nausea or Vomiting, Diarrhea      Electronically signed by Thania Nolasco RD, LD on 5/28/19 at 1:19 PM    Contact Number: Office: [phone]; [address]: [facility]

## 2019-05-28 NOTE — FLOWSHEET NOTE
05/28/19 1347   Encounter Summary   Services provided to: Patient   Referral/Consult From: Nurse   Continue Visiting   (5-28, initial, AD info.)   Complexity of Encounter Moderate   Length of Encounter 15 minutes   Routine   Type Initial   Assessment Calm; Approachable   Intervention Active listening;Nurtured hope   Outcome Engaged in [employer] (For Healthcare)   Healthcare Directive No, patient does not have an advance directive for healthcare treatment   Information on Healthcare Directives Requested Yes   Advance Directives Documents given;Documents explained;Pt. not interested at this time    provided forms and education on advance directives. She states she has \"someone in mind. \"  Offered to help her complete documents when she is ready. No further needs. PRN follow-up.

## 2019-05-28 NOTE — CONSULTS
Patient Name: Nargis Dates  Date of admission: 5/27/2019  3:44 PM  Patient's age: 46 y.o., 1965  Admission Dx: A-fib ([facility] [I48.91]  A-fib ([facility] [I48.91]    Reason for Consult:  atrial fibrillation  Requesting Physician: Lizzie Mcdaniel MD  Primary Care physician: No primary care provider on file. History Obtained From:  patient    HISTORY OF PRESENT ILLNESS:      The patient is a 46 y.o.  female who is admitted to the hospital for abdominal pain and vomiting for 2 days. On presentation rhythm is atrial fibrillation with rapid ventricular response and no symptoms. Patient was also diagnosed with hyperthyroid. She has lost significant amount of weight. Please see admission H&P for further details. Current rhythm: atrial fibrillation   Known history of atrial fibrillation: no  Valvular disease: No  Associated symptoms: no symptoms   Heart rate is not controlled  Previous cardioversion and/or ablation: no  History of CAD: No  History of sleep apnea: No  History of ETOH abuse/drug abuse: No  History of thyroid disease: Yes  Left atrial size is Normal      The patient is not able to give detailed information about the events of hospitalization due to their underlying medical illness. The majority of the information is taken from the chart, medical staff, and available family. Past Medical History:   has a past medical history of Depression. Past Surgical History:   has a past surgical history that includes Ankle surgery; [place] tooth extraction; Mouth surgery; and Ankle surgery. Home Medications:    Prior to Admission medications    Medication Sig Start Date End Date Taking?  Authorizing Provider   amphetamine-dextroamphetamine (ADDERALL) 20 MG tablet Take 20 mg by mouth daily   Yes Historical Provider, MD   citalopram (CELEXA) 20 MG tablet Take 20 mg by mouth daily   Yes Historical Provider, MD   ALPRAZolam (XANAX) 0.5 MG tablet Take 0.5 mg by mouth nightly as needed for Sleep   Yes Historical Provider, MD   OXcarbazepine (TRILEPTAL) 150 MG tablet Take 400 mg by mouth daily     Historical Provider, MD       Allergies:  Aspirin; Codeine; Penicillins; and Poultry meal    Social History:   reports that she has been smoking cigarettes. She has been smoking about 0.50 packs per day. She has never used smokeless tobacco. She reports that she drinks alcohol. She reports that she has current or past drug history. Drug: Marijuana. Family History: No premature coronary artery disease     REVIEW OF SYSTEMS:      All 14-point review of systems are completed and  pertinent positives are mentioned in the history of present illness. Other  systems are reviewed and are negative. PHYSICAL EXAM:      Vital Signs:           Blood pressure 116/77, pulse 128, temperature 98 °F (36.7 °C), temperature source Oral, resp. rate 16, height 5' 1\" (1.549 m), weight 84 lb (38.1 kg), last menstrual period 11/26/2012, SpO2 97 %. Admission Weight:  Weight: 100 lb (45.4 kg)      I/O:     Intake/Output Summary (Last 24 hours) at 5/28/2019 4793  Last data filed at 5/28/2019 0809  Gross per 24 hour   Intake 1050 ml   Output 0 ml   Net 1050 ml             Constitutional and General Appearance: alert, fatigued, cooperative, no distress and appears stated age  HEENT: PERRL, no cervical lymphadenopathy. No masses palpable.  Normal oral mucosa  Respiratory:  · Normal excursion and expansion without use of accessory muscles  · Resp Auscultation: Normal breath sounds without dullness or wheezing  Cardiovascular:  · The apical impulse is not displaced  · Heart tones are crisp and normal. irregular S1 and S2.  · Jugular venous pulsation Normal  · The carotid upstroke is normal in amplitude and contour without delay or bruit  · Peripheral pulses are symmetrical and full  Abdomen:  · No masses or tenderness  · Bowel sounds present  Extremities:  ·  No Cyanosis or Clubbing  ·  Lower extremity edema: No  · Skin: Warm and dry  Neurological:  · Alert and oriented. · Moves all extremities well  · No abnormalities of mood, affect, memory, mentation, or behavior are noted    DATA:    ECG: EKG: coarse atrial fibrillation   ECHO: Indications:Atrial fibrillation. Patient Status: Routine    Height: 61 inches Weight: 100 pounds BSA: 1.41 m2 BMI: 18.89 kg/m2    BP: 100/66 mmHg     Conclusions      Summary   Heart rhythm is irregular.   Left ventricular systolic function is normal with ejection fraction   estimated at 55%.   No regional wall motion abnormalities.   Normal left ventricular diastolic filling pressure.   Mild mitral regurgitation.   Mild tricuspid regurgitation.   Systolic pulmonary artery pressure (SPAP) is normal estimated at 31 mmHg   (Right atrial pressure of 3 mmHg).   No intracardiac thrombus.      Signature      ------------------------------------------------------------------   Electronically signed by Shayy Lucero MD, [facility] (Interpreting   physician) on 05/28/2019 at 11:36 AM   ------------------------------------------------------------------    LABS:   CBC:   Recent Labs     05/27/19  1544 05/28/19  0304   WBC 14.1* 11.4*   HGB 16.3* 12.2   HCT 47.5 36.3    154     BMP:   Recent Labs     05/27/19  1544 05/28/19  0304   * 137   K 3.8 3.2*   CO2 23 24   BUN 16 8   CREATININE <0.5* <0.5*   LABGLOM >60 >60   GLUCOSE 146* 106*     BNP: No results for input(s): BNP in the last 72 hours. PT/INR: No results for input(s): PROTIME, INR in the last 72 hours. APTT:No results for input(s): APTT in the last 72 hours. CARDIAC ENZYMES:  Recent Labs     05/27/19  1544   TROPONINI <0.01     FASTING LIPID PANEL:No results found for: HDL, LDLDIRECT, LDLCALC, TRIG  LIVER PROFILE:  Recent Labs     05/27/19  1544   AST 17   ALT 22       IMPRESSION:    Active Problems:    Paroxysmal atrial fibrillation (HCC)    Hyperthyroidism  Resolved Problems:    * No resolved hospital problems. *      RECOMMENDATIONS:  1. New diagnosis of atrial fibrillation    -Heart rate vs heart rhythm discussed   -AV node blocking medications and antiarrhythmic medications discussed   -Anticoagulation discussed   -Direct current cardioversion and Electrophysiology study with possible radiofrequency catheter ablation discussed    -discontinue Metoprolol and start Propranolol 20 mg tid(uptitrae as needed)    [id number] score   -[id number] Vasc score and HAS bled score discussed   -Short and long term risk, benefit, alternative and rationale of anticoagulation therapy discussed.    -Coumadin and direct oral anticoagulants discussed(DOAC). -Short term anticoagulation is recommended in anticipation of out patient direct current cardioversion on thyroid function under control   -start Xarelto 20 mg daily     2. New diagnosis of hyperthyroid    -plan per primary team    Thank you for asking me to assist in the care of this patient. Please contact me if you have any questions regarding her evaluation. All questions and concerns were addressed to the patient/family. Alternatives to my treatment were discussed. The note was completed using EMR. Every effort was made to ensure accuracy; however, inadvertent computerized transcription errors may be present. Discussed with patient and Nurse. JESSI AhujaC.   Electrophysiology  [address] [facility]  [phone] [facility]  [phone] [address] [place] [address]  5/28/2019  8:28 AM

## 2019-05-28 NOTE — PLAN OF CARE
Problem: Falls - Risk of:  Goal: Will remain free from falls  Description  Will remain free from falls  Outcome: Ongoing     Problem: Safety:  Goal: Free from accidental physical injury  Description  Free from accidental physical injury  Outcome: Ongoing     Problem: Daily Care:  Goal: Daily care needs are met  Description  Daily care needs are met  Outcome: Ongoing

## 2019-05-29 LAB
ANION GAP SERPL CALCULATED.3IONS-SCNC: 11 MMOL/L (ref 3–16)
BASOPHILS ABSOLUTE: 0.1 K/UL (ref 0–0.2)
BASOPHILS RELATIVE PERCENT: 0.6 %
BUN BLDV-MCNC: 9 MG/DL (ref 7–20)
CALCIUM SERPL-MCNC: 8.6 MG/DL (ref 8.3–10.6)
CHLORIDE BLD-SCNC: 102 MMOL/L (ref 99–110)
CO2: 25 MMOL/L (ref 21–32)
CREAT SERPL-MCNC: <0.5 MG/DL (ref 0.6–1.1)
EOSINOPHILS ABSOLUTE: 0.1 K/UL (ref 0–0.6)
EOSINOPHILS RELATIVE PERCENT: 0.9 %
GFR AFRICAN AMERICAN: >60
GFR NON-AFRICAN AMERICAN: >60
GLUCOSE BLD-MCNC: 100 MG/DL (ref 70–99)
HCT VFR BLD CALC: 38.2 % (ref 36–48)
HEMOGLOBIN: 12.9 G/DL (ref 12–16)
LIPASE: 27 U/L (ref 13–60)
LYMPHOCYTES ABSOLUTE: 2.4 K/UL (ref 1–5.1)
LYMPHOCYTES RELATIVE PERCENT: 23.5 %
MCH RBC QN AUTO: 28.7 PG (ref 26–34)
MCHC RBC AUTO-ENTMCNC: 33.6 G/DL (ref 31–36)
MCV RBC AUTO: 85.5 FL (ref 80–100)
MONOCYTES ABSOLUTE: 0.7 K/UL (ref 0–1.3)
MONOCYTES RELATIVE PERCENT: 7.1 %
NEUTROPHILS ABSOLUTE: 6.9 K/UL (ref 1.7–7.7)
NEUTROPHILS RELATIVE PERCENT: 67.9 %
PDW BLD-RTO: 12.7 % (ref 12.4–15.4)
PLATELET # BLD: 166 K/UL (ref 135–450)
PMV BLD AUTO: 10.2 FL (ref 5–10.5)
POTASSIUM SERPL-SCNC: 3.6 MMOL/L (ref 3.5–5.1)
RBC # BLD: 4.48 M/UL (ref 4–5.2)
SODIUM BLD-SCNC: 138 MMOL/L (ref 136–145)
TROPONIN: <0.01 NG/ML
URINE CULTURE, ROUTINE: NORMAL
WBC # BLD: 10.2 K/UL (ref 4–11)

## 2019-05-29 PROCEDURE — 2580000003 HC RX 258: Performed by: INTERNAL MEDICINE

## 2019-05-29 PROCEDURE — 2500000003 HC RX 250 WO HCPCS: Performed by: NURSE PRACTITIONER

## 2019-05-29 PROCEDURE — 6370000000 HC RX 637 (ALT 250 FOR IP): Performed by: NURSE PRACTITIONER

## 2019-05-29 PROCEDURE — 6360000002 HC RX W HCPCS: Performed by: INTERNAL MEDICINE

## 2019-05-29 PROCEDURE — 6370000000 HC RX 637 (ALT 250 FOR IP): Performed by: INTERNAL MEDICINE

## 2019-05-29 PROCEDURE — 99232 SBSQ HOSP IP/OBS MODERATE 35: CPT | Performed by: NURSE PRACTITIONER

## 2019-05-29 PROCEDURE — 36415 COLL VENOUS BLD VENIPUNCTURE: CPT

## 2019-05-29 PROCEDURE — 80048 BASIC METABOLIC PNL TOTAL CA: CPT

## 2019-05-29 PROCEDURE — C9113 INJ PANTOPRAZOLE SODIUM, VIA: HCPCS | Performed by: NURSE PRACTITIONER

## 2019-05-29 PROCEDURE — 1200000000 HC SEMI PRIVATE

## 2019-05-29 PROCEDURE — 85025 COMPLETE CBC W/AUTO DIFF WBC: CPT

## 2019-05-29 PROCEDURE — 83690 ASSAY OF LIPASE: CPT

## 2019-05-29 PROCEDURE — 6360000002 HC RX W HCPCS: Performed by: NURSE PRACTITIONER

## 2019-05-29 PROCEDURE — 84484 ASSAY OF TROPONIN QUANT: CPT

## 2019-05-29 RX ORDER — DILTIAZEM HYDROCHLORIDE 5 MG/ML
5 INJECTION INTRAVENOUS ONCE
Status: COMPLETED | OUTPATIENT
Start: 2019-05-29 | End: 2019-05-29

## 2019-05-29 RX ORDER — DIGOXIN 0.25 MG/ML
250 INJECTION INTRAMUSCULAR; INTRAVENOUS EVERY 6 HOURS
Status: COMPLETED | OUTPATIENT
Start: 2019-05-29 | End: 2019-05-30

## 2019-05-29 RX ORDER — PANTOPRAZOLE SODIUM 40 MG/10ML
40 INJECTION, POWDER, LYOPHILIZED, FOR SOLUTION INTRAVENOUS 2 TIMES DAILY
Status: DISCONTINUED | OUTPATIENT
Start: 2019-05-29 | End: 2019-05-31 | Stop reason: HOSPADM

## 2019-05-29 RX ORDER — DILTIAZEM HYDROCHLORIDE 60 MG/1
30 TABLET, FILM COATED ORAL EVERY 6 HOURS SCHEDULED
Status: DISCONTINUED | OUTPATIENT
Start: 2019-05-29 | End: 2019-05-30

## 2019-05-29 RX ADMIN — PANTOPRAZOLE SODIUM 40 MG: 40 INJECTION, POWDER, FOR SOLUTION INTRAVENOUS at 14:18

## 2019-05-29 RX ADMIN — ACETAMINOPHEN 650 MG: 325 TABLET ORAL at 02:49

## 2019-05-29 RX ADMIN — PROPRANOLOL HYDROCHLORIDE 20 MG: 10 TABLET ORAL at 02:47

## 2019-05-29 RX ADMIN — DILTIAZEM HYDROCHLORIDE 5 MG: 5 INJECTION INTRAVENOUS at 04:08

## 2019-05-29 RX ADMIN — ENOXAPARIN SODIUM 40 MG: 40 INJECTION SUBCUTANEOUS at 21:19

## 2019-05-29 RX ADMIN — PANTOPRAZOLE SODIUM 40 MG: 40 INJECTION, POWDER, FOR SOLUTION INTRAVENOUS at 21:30

## 2019-05-29 RX ADMIN — PROPRANOLOL HYDROCHLORIDE 20 MG: 10 TABLET ORAL at 14:22

## 2019-05-29 RX ADMIN — PROMETHAZINE HYDROCHLORIDE 6.25 MG: 25 INJECTION INTRAMUSCULAR; INTRAVENOUS at 02:57

## 2019-05-29 RX ADMIN — DIGOXIN 250 MCG: 250 INJECTION, SOLUTION INTRAMUSCULAR; INTRAVENOUS; PARENTERAL at 21:21

## 2019-05-29 RX ADMIN — DIGOXIN 250 MCG: 250 INJECTION, SOLUTION INTRAMUSCULAR; INTRAVENOUS; PARENTERAL at 09:14

## 2019-05-29 RX ADMIN — DIGOXIN 250 MCG: 250 INJECTION, SOLUTION INTRAMUSCULAR; INTRAVENOUS; PARENTERAL at 15:31

## 2019-05-29 RX ADMIN — DILTIAZEM HYDROCHLORIDE 30 MG: 60 TABLET, FILM COATED ORAL at 21:15

## 2019-05-29 RX ADMIN — Medication 10 ML: at 21:30

## 2019-05-29 RX ADMIN — METHIMAZOLE 5 MG: 5 TABLET ORAL at 22:01

## 2019-05-29 RX ADMIN — PROMETHAZINE HYDROCHLORIDE 6.25 MG: 25 INJECTION INTRAMUSCULAR; INTRAVENOUS at 09:14

## 2019-05-29 ASSESSMENT — PAIN SCALES - GENERAL: PAINLEVEL_OUTOF10: 0

## 2019-05-29 NOTE — PROGRESS NOTES
Hospitalist Progress Note      PCP: No primary care provider on file. Date of Admission: 5/27/2019    Chief Complaint: weakness, palpitation    Hospital Course: admitted with AF with RVR. No previously known cardiac condition. Cardiology consulted     Subjective: weakness, HR still elevated  +N/V starting in the middle of the night. She is unable to PO.   C/O heartburn. Medications:  Reviewed    Infusion Medications    sodium chloride 125 mL/hr at 05/28/19 2044     Scheduled Medications    digoxin  250 mcg Intravenous Q6H    nicotine  1 patch Transdermal Daily    enoxaparin  40 mg Subcutaneous BID    propranolol  20 mg Oral TID    methimazole  5 mg Oral TID    amphetamine-dextroamphetamine  20 mg Oral Daily    citalopram  20 mg Oral Daily    OXcarbazepine  375 mg Oral Daily    sodium chloride flush  10 mL Intravenous 2 times per day     PRN Meds: morphine, promethazine, ALPRAZolam, sodium chloride flush, magnesium hydroxide, acetaminophen      Intake/Output Summary (Last 24 hours) at 5/29/2019 0945  Last data filed at 5/29/2019 0840  Gross per 24 hour   Intake 2921.67 ml   Output 0 ml   Net 2921.67 ml       Physical Exam Performed:    /81   Pulse 114   Temp 98 °F (36.7 °C) (Axillary)   Resp 16   Ht 5' 1\" (1.549 m)   Wt 84 lb (38.1 kg)   LMP 11/26/2012   SpO2 98%   BMI 15.87 kg/m²     General appearance:, thin frail, moderate  distress, appears stated age and cooperative. HEENT: Pupils equal, round, and reactive to light. Conjunctivae/corneas clear. Neck: Supple, with full range of motion. No jugular venous distention. Trachea midline. Respiratory:  Normal respiratory effort. Clear to auscultation, bilaterally without Rales/Wheezes/Rhonchi. Cardiovascular: Tachycardic, irregularly irregular rhythm with normal S1/S2 without murmurs, rubs or gallops. Abdomen: Soft, non-tender, non-distended with normal bowel sounds. Musculoskeletal: No clubbing, cyanosis or edema bilaterally. Full range of motion without deformity. Skin: Skin color, texture, turgor normal.  No rashes or lesions. Neurologic:  Neurovascularly intact without any focal sensory/motor deficits. Cranial nerves: II-XII intact, grossly non-focal.  Psychiatric: Alert and oriented, thought content appropriate, normal insight  Capillary Refill: Brisk,< 3 seconds   Peripheral Pulses: +2 palpable, equal bilaterally       Labs:   Recent Labs     05/27/19  1544 05/28/19  0304 05/29/19  0724   WBC 14.1* 11.4* 10.2   HGB 16.3* 12.2 12.9   HCT 47.5 36.3 38.2    154 166     Recent Labs     05/27/19  1544 05/28/19  0304 05/29/19  0724   * 137 138   K 3.8 3.2* 3.6   CL 95* 105 102   CO2 23 24 25   BUN 16 8 9   CREATININE <0.5* <0.5* <0.5*   CALCIUM 10.0 8.3 8.6   PHOS  --  3.6  --      Recent Labs     05/27/19  1544   AST 17   ALT 22   BILITOT 0.8   ALKPHOS 109     No results for input(s): INR in the last 72 hours. Recent Labs     05/27/19  1544   TROPONINI <0.01       Urinalysis:      Lab Results   Component Value Date    NITRU Negative 05/27/2019    WBCUA 6-10 05/27/2019    BACTERIA 1+ 05/27/2019    RBCUA 0-2 05/27/2019    BLOODU Negative 05/27/2019    SPECGRAV <=1.005 05/27/2019    GLUCOSEU Negative 05/27/2019       Radiology:  CT ABDOMEN PELVIS W IV CONTRAST Additional Contrast? None   Final Result   Marked mural thickening of the gastric body compatible with gastritis. Definite ulceration not identified, but that can be difficult to visualized   with CT technique. Consider direct visualization. There is fluid-filled distention of small bowel in the left mid upper   abdomen, with evidence of mucosal hyperenhancement. Consequently, component   of enteritis is also considered. There is poor visualization of the proximal celiac and superior mesenteric   arteries, which is probably technical in nature. Correlate with any clinical   evidence acute and/or chronic mesenteric ischemia.   Consider further   evaluation

## 2019-05-29 NOTE — PROGRESS NOTES
End of shift report given to [address], RN at bedside. Patient alert and oriented. Bed in lowest position with wheels locked. Call light within reach. No further needs at this time.

## 2019-05-29 NOTE — PROGRESS NOTES
[address]     Electrophysiology                                     Progress Note    Admission date:  2019    Reason for follow up visit: afib    HPI/CC: [place] was admitted on 2019 with nausea. EKG showed afib with RVR. Has also been treated for newly diagnosed hyperthyroidism. Rhythm has been afib with an average HR of 115. Subjective: She complains of nausea. Denies chest pain and palpitations. Vitals:  Blood pressure 128/81, pulse 114, temperature 98 °F (36.7 °C), temperature source Axillary, resp. rate 16, height 5' 1\" (1.549 m), weight 84 lb (38.1 kg), last menstrual period 2012, SpO2 98 %.   Temp  Av.5 °F (36.9 °C)  Min: 97.8 °F (36.6 °C)  Max: 99.4 °F (37.4 °C)  Pulse  Av  Min: 75  Max: 121  BP  Min: 87/60  Max: 154/85  SpO2  Av.7 %  Min: 95 %  Max: 98 %    24 hour I/O    Intake/Output Summary (Last 24 hours) at 2019 0832  Last data filed at 2019 0814  Gross per 24 hour   Intake 2921.67 ml   Output 0 ml   Net 2921.67 ml     Current Facility-Administered Medications   Medication Dose Route Frequency Provider Last Rate Last Dose    nicotine (NICODERM CQ) 14 MG/24HR 1 patch  1 patch Transdermal Daily Corazon Ellsworth MD   1 patch at 19 025    enoxaparin (LOVENOX) injection 40 mg  40 mg Subcutaneous BID Sendy Gutierrez MD   40 mg at 19    propranolol (INDERAL) tablet 20 mg  20 mg Oral TID Costa Barrera MD   20 mg at 19 0247    methimazole (TAPAZOLE) tablet 5 mg  5 mg Oral TID Sendy Gutierrez MD   5 mg at 19    morphine injection 4 mg  4 mg Intravenous Q4H PRN Corazon Ellsworth MD   4 mg at 19 1621    promethazine (PHENERGAN) injection 6.25 mg  6.25 mg Intramuscular Q6H PRN Corazon Ellsworth MD   6.25 mg at 19 0257    ALPRAZolam (XANAX) tablet 0.5 mg  0.5 mg Oral Nightly PRN Corazon Ellsworth MD        amphetamine-dextroamphetamine (ADDERALL) tablet 20 mg  20 mg Oral Daily Yaneth Mc MD   20 mg at 05/28/19 0849    citalopram (CELEXA) tablet 20 mg  20 mg Oral Daily Yaneth Mc MD   20 mg at 05/28/19 2048    OXcarbazepine (TRILEPTAL) tablet 375 mg  375 mg Oral Daily Yaneth Mc MD   375 mg at 05/28/19 2047    sodium chloride flush 0.9 % injection 10 mL  10 mL Intravenous 2 times per day Yaneth Mc MD   10 mL at 05/28/19 0850    sodium chloride flush 0.9 % injection 10 mL  10 mL Intravenous PRN Yaneth Mc MD        magnesium hydroxide (MILK OF MAGNESIA) 400 MG/5ML suspension 30 mL  30 mL Oral Daily PRN Yaneth Mc MD        0.9 % sodium chloride infusion   Intravenous Continuous Yaneth Mc  mL/hr at 05/28/19 2044      acetaminophen (TYLENOL) tablet 650 mg  650 mg Oral Q4H PRN Yaneth Mc MD   650 mg at 05/29/19 0249       Objective:     Telemetry monitor: afib    Physical Exam:  Constitutional and general appearance: fatigued, cooperative, no distress, appears older than stated age and pale; cachectic   HEENT: PERRL, no cervical lymphadenopathy. No masses palpable.  Normal oral mucosa  Respiratory:  · Normal excursion and expansion without use of accessory muscles  · Resp auscultation: Normal breath sounds without wheezing, rhonchi, and rales  Cardiovascular:  · The apical impulse is not displaced  · Heart tones are crisp and normal. irregular S1 and S2.  · Jugular venous pulsation Normal  · The carotid upstroke is normal in amplitude and contour without delay or bruit  · Peripheral pulses are symmetrical and full   Abdomen:  · No masses or tenderness  · Bowel sounds present  Extremities:  ·  No cyanosis or clubbing  ·  No lower extremity edema  ·  Skin: warm and dry  Neurological:  · Alert and oriented  · Moves all extremities well  · No abnormalities of mood, affect, memory, mentation, or behavior are noted    Data  EKG 5/27/2019:  Atrial fibrillation     Echo  5/29/2019:  pending     All labs and testing reviewed. Lab Review     Renal Profile:   Lab Results   Component Value Date    CREATININE <0.5 05/29/2019    BUN 9 05/29/2019     05/29/2019    K 3.6 05/29/2019    K 3.2 05/28/2019     05/29/2019    CO2 25 05/29/2019     CBC:    Lab Results   Component Value Date    WBC 10.2 05/29/2019    RBC 4.48 05/29/2019    HGB 12.9 05/29/2019    HCT 38.2 05/29/2019    MCV 85.5 05/29/2019    RDW 12.7 05/29/2019     05/29/2019     BNP:  No results found for: BNP  Fasting Lipid Panel:    Lab Results   Component Value Date    CHOL 95 05/28/2019    HDL 46 05/28/2019    TRIG 53 05/28/2019     Cardiac Enzymes:  CK/MbTroponin  Lab Results   Component Value Date    TROPONINI <0.01 05/27/2019     PT/ INR No results found for: INR, PROTIME  PTT No results found for: PTT   Lab Results   Component Value Date    MG 1.50 05/28/2019    No results found for: TSH    Assessment:  1. Atrial fibrillation of unknown duration: ongoing   -HR poorly controlled   -[id number] score 1 (gender)  2. Nausea: ongoing  3. Hyperthyroidism: new diagnosis   4. Low BMI  5. Tobacco abuse: smoking cessation recommended     Plan:   1. IV digoxin load due to nausea and patient refusing propranolol  2. Restart propranolol when patient taking po  3. Will have to start IV Cardizem if HR remains elevated after IV load  4. Continue therapeutic Lovenox and start Xarelto 20mg po QD when taking po (CrCl 75mL/min)  5. Echo ordered 5/27  6. Adderall likely contributing to GI symptoms. Will defer to primary.      James Schreiber, APRN-CNP  [address]  [phone]

## 2019-05-29 NOTE — PROGRESS NOTES
Kaykay Hernandez, NP paged \"Patient complaining of severe heart burn related to N&V. Patient unable to tolerate PO medications at this time. Can we have an order for something? Thank you\"    Awaiting response.        Per Kaykay Hernandez see MAR

## 2019-05-29 NOTE — PROGRESS NOTES
Perfect Serve to Adrian Knowles CNP:    Patient's propranolol held due to low BP; HR now 125 AFib. Last /61. Can I give 2100 dose now? Please advise. Thank you. Electronically signed by Angel Ness RN on 5/29/2019 at 1:08 AM     Response:  \"Yes please\"    Electronically signed by Angel Ness RN on 5/29/2019 at 2:26 AM

## 2019-05-29 NOTE — PLAN OF CARE
Problem: Pain:  Goal: Pain level will decrease  Description  Pain level will decrease  Outcome: Ongoing  Note:   Patient currently not in pain. Will continue to monitor. Problem: Falls - Risk of:  Goal: Will remain free from falls  Description  Will remain free from falls  Outcome: Ongoing  Note:   Pt low fall risk. Instructed to use call light before getting out of bed. Call light within reach. Bed in low position. Bed alarm on. Will continue to monitor. Problem: Daily Care:  Goal: Daily care needs are met  Description  Daily care needs are met  Outcome: Ongoing  Note:   Patient's daily care needs met at this time. Will continue to monitor. Problem: Skin Integrity:  Goal: Skin integrity will stabilize  Description  Skin integrity will stabilize  Outcome: Ongoing  Note:   No issues with skin integrity. Will continue to monitor.

## 2019-05-29 NOTE — PROGRESS NOTES
Perfect Serve to Dot Roc, CNP:    Pt given propranolol at 0247 but HR continues to range from 120s to 160s. Afib. Should one time dose of med for rate control be given? Please advise. Thank you.     Electronically signed by Josette Young RN on 5/29/2019 at 3:39 AM     Response: See orders    Electronically signed by Josette Young RN on 5/29/2019 at 3:55 AM

## 2019-05-30 PROCEDURE — 6360000002 HC RX W HCPCS: Performed by: NURSE PRACTITIONER

## 2019-05-30 PROCEDURE — 1200000000 HC SEMI PRIVATE

## 2019-05-30 PROCEDURE — 6370000000 HC RX 637 (ALT 250 FOR IP): Performed by: NURSE PRACTITIONER

## 2019-05-30 PROCEDURE — C9113 INJ PANTOPRAZOLE SODIUM, VIA: HCPCS | Performed by: NURSE PRACTITIONER

## 2019-05-30 PROCEDURE — 6370000000 HC RX 637 (ALT 250 FOR IP): Performed by: INTERNAL MEDICINE

## 2019-05-30 PROCEDURE — 88305 TISSUE EXAM BY PATHOLOGIST: CPT

## 2019-05-30 PROCEDURE — 2580000003 HC RX 258: Performed by: INTERNAL MEDICINE

## 2019-05-30 PROCEDURE — 6360000002 HC RX W HCPCS: Performed by: INTERNAL MEDICINE

## 2019-05-30 PROCEDURE — 99152 MOD SED SAME PHYS/QHP 5/>YRS: CPT | Performed by: INTERNAL MEDICINE

## 2019-05-30 PROCEDURE — 7100000010 HC PHASE II RECOVERY - FIRST 15 MIN: Performed by: INTERNAL MEDICINE

## 2019-05-30 PROCEDURE — 99232 SBSQ HOSP IP/OBS MODERATE 35: CPT | Performed by: NURSE PRACTITIONER

## 2019-05-30 PROCEDURE — 2709999900 HC NON-CHARGEABLE SUPPLY: Performed by: INTERNAL MEDICINE

## 2019-05-30 PROCEDURE — 7100000011 HC PHASE II RECOVERY - ADDTL 15 MIN: Performed by: INTERNAL MEDICINE

## 2019-05-30 PROCEDURE — 3609012400 HC EGD TRANSORAL BIOPSY SINGLE/MULTIPLE: Performed by: INTERNAL MEDICINE

## 2019-05-30 PROCEDURE — 0DB68ZX EXCISION OF STOMACH, VIA NATURAL OR ARTIFICIAL OPENING ENDOSCOPIC, DIAGNOSTIC: ICD-10-PCS | Performed by: INTERNAL MEDICINE

## 2019-05-30 RX ORDER — DILTIAZEM HYDROCHLORIDE 180 MG/1
180 CAPSULE, COATED, EXTENDED RELEASE ORAL DAILY
Status: DISCONTINUED | OUTPATIENT
Start: 2019-05-30 | End: 2019-05-31 | Stop reason: HOSPADM

## 2019-05-30 RX ORDER — FENTANYL CITRATE 50 UG/ML
INJECTION, SOLUTION INTRAMUSCULAR; INTRAVENOUS PRN
Status: DISCONTINUED | OUTPATIENT
Start: 2019-05-30 | End: 2019-05-30 | Stop reason: ALTCHOICE

## 2019-05-30 RX ORDER — DIPHENHYDRAMINE HYDROCHLORIDE 50 MG/ML
INJECTION INTRAMUSCULAR; INTRAVENOUS PRN
Status: DISCONTINUED | OUTPATIENT
Start: 2019-05-30 | End: 2019-05-30 | Stop reason: ALTCHOICE

## 2019-05-30 RX ORDER — MIDAZOLAM HYDROCHLORIDE 5 MG/ML
INJECTION INTRAMUSCULAR; INTRAVENOUS PRN
Status: DISCONTINUED | OUTPATIENT
Start: 2019-05-30 | End: 2019-05-30 | Stop reason: ALTCHOICE

## 2019-05-30 RX ADMIN — SODIUM CHLORIDE: 9 INJECTION, SOLUTION INTRAVENOUS at 20:16

## 2019-05-30 RX ADMIN — DEXTROAMPHETAMINE SACCHARATE, AMPHETAMINE ASPARTATE, DEXTROAMPHETAMINE SULFATE AND AMPHETAMINE SULFATE 20 MG: 2.5; 2.5; 2.5; 2.5 TABLET ORAL at 10:47

## 2019-05-30 RX ADMIN — DILTIAZEM HYDROCHLORIDE 30 MG: 60 TABLET, FILM COATED ORAL at 12:06

## 2019-05-30 RX ADMIN — METHIMAZOLE 5 MG: 5 TABLET ORAL at 15:08

## 2019-05-30 RX ADMIN — OXCARBAZEPINE 375 MG: 150 TABLET ORAL at 20:16

## 2019-05-30 RX ADMIN — DILTIAZEM HYDROCHLORIDE 30 MG: 60 TABLET, FILM COATED ORAL at 04:05

## 2019-05-30 RX ADMIN — SODIUM CHLORIDE: 9 INJECTION, SOLUTION INTRAVENOUS at 13:19

## 2019-05-30 RX ADMIN — METHIMAZOLE 5 MG: 5 TABLET ORAL at 10:47

## 2019-05-30 RX ADMIN — PANTOPRAZOLE SODIUM 40 MG: 40 INJECTION, POWDER, FOR SOLUTION INTRAVENOUS at 20:15

## 2019-05-30 RX ADMIN — METHIMAZOLE 5 MG: 5 TABLET ORAL at 20:16

## 2019-05-30 RX ADMIN — DILTIAZEM HYDROCHLORIDE 180 MG: 180 CAPSULE, EXTENDED RELEASE ORAL at 17:31

## 2019-05-30 RX ADMIN — CITALOPRAM HYDROBROMIDE 20 MG: 20 TABLET ORAL at 20:16

## 2019-05-30 RX ADMIN — Medication 10 ML: at 20:17

## 2019-05-30 RX ADMIN — DIGOXIN 250 MCG: 250 INJECTION, SOLUTION INTRAMUSCULAR; INTRAVENOUS; PARENTERAL at 04:05

## 2019-05-30 RX ADMIN — PANTOPRAZOLE SODIUM 40 MG: 40 INJECTION, POWDER, FOR SOLUTION INTRAVENOUS at 10:47

## 2019-05-30 ASSESSMENT — PAIN SCALES - GENERAL
PAINLEVEL_OUTOF10: 0

## 2019-05-30 NOTE — PROGRESS NOTES
[facility]     Electrophysiology                                     Progress Note    Admission date:  2019    Reason for follow up visit: afib    HPI/CC: [place] was admitted on 2019 with nausea. EKG showed afib with RVR. Has also been treated for newly diagnosed hyperthyroidism and EGD  showed gastric ulcer. Rhythm has been afib with an average HR of 80. Subjective: States nausea is improving. Denies chest pain, palpitations, shortness of breath, and dizziness. Vitals:  Blood pressure 117/73, pulse 81, temperature 98.9 °F (37.2 °C), temperature source Oral, resp. rate 16, height 5' 1\" (1.549 m), weight 84 lb (38.1 kg), last menstrual period 2012, SpO2 97 %.   Temp  Av.8 °F (37.1 °C)  Min: 97.4 °F (36.3 °C)  Max: 99.6 °F (37.6 °C)  Pulse  Av.9  Min: 76  Max: 109  BP  Min: 85/55  Max: 146/76  SpO2  Av.1 %  Min: 96 %  Max: 100 %    24 hour I/O    Intake/Output Summary (Last 24 hours) at 2019 1553  Last data filed at 2019 1506  Gross per 24 hour   Intake 4436.76 ml   Output 0 ml   Net 4436.76 ml     Current Facility-Administered Medications   Medication Dose Route Frequency Provider Last Rate Last Dose    pantoprazole (PROTONIX) injection 40 mg  40 mg Intravenous BID ANALIA Gonzalez CNP   40 mg at 19 1047    diltiazem (CARDIZEM) tablet 30 mg  30 mg Oral 4 times per day ANALIA Davis CNP   30 mg at 19 1206    nicotine (NICODERM CQ) 14 MG/24HR 1 patch  1 patch Transdermal Daily Steffen Mendez MD   1 patch at 19 1050    enoxaparin (LOVENOX) injection 40 mg  40 mg Subcutaneous BID Sheryle Mainland, MD   Stopped at 19 0800    methimazole (TAPAZOLE) tablet 5 mg  5 mg Oral TID Sheryle Mainland, MD   5 mg at 19 1508    morphine injection 4 mg  4 mg Intravenous Q4H PRN Steffen Mendez MD   4 mg at 19 1621    promethazine (PHENERGAN) injection 6.25 mg  6.25 mg Intramuscular Q6H PRN Mary Armendariz MD   6.25 mg at 05/29/19 0914    ALPRAZolam (XANAX) tablet 0.5 mg  0.5 mg Oral Nightly PRN Mary Armendariz MD        citalopram (CELEXA) tablet 20 mg  20 mg Oral Daily Mary Armendariz MD   20 mg at 05/28/19 2048    OXcarbazepine (TRILEPTAL) tablet 375 mg  375 mg Oral Daily Mary Armendariz MD   Stopped at 05/29/19 5546    sodium chloride flush 0.9 % injection 10 mL  10 mL Intravenous 2 times per day Mary Armendariz MD   Stopped at 05/30/19 1051    sodium chloride flush 0.9 % injection 10 mL  10 mL Intravenous PRN Mary Armendariz MD        magnesium hydroxide (MILK OF MAGNESIA) 400 MG/5ML suspension 30 mL  30 mL Oral Daily PRN Mary Armendariz MD        0.9 % sodium chloride infusion   Intravenous Continuous Mary Armendariz  mL/hr at 05/30/19 1319      acetaminophen (TYLENOL) tablet 650 mg  650 mg Oral Q4H PRN Mary Armendariz MD   650 mg at 05/29/19 0249       Objective:     Telemetry monitor: afib    Physical Exam:  Constitutional and general appearance: alert, cooperative, no distress, appears older than stated age and pale; cachectic   HEENT: PERRL, no cervical lymphadenopathy. No masses palpable.  Normal oral mucosa  Respiratory:  · Normal excursion and expansion without use of accessory muscles  · Resp auscultation: Normal breath sounds without wheezing, rhonchi, and rales  Cardiovascular:  · The apical impulse is not displaced  · Heart tones are crisp and normal. irregular S1 and S2.  · Jugular venous pulsation Normal  · The carotid upstroke is normal in amplitude and contour without delay or bruit  · Peripheral pulses are symmetrical and full   Abdomen:  · No masses or tenderness  · Bowel sounds present  Extremities:  ·  No cyanosis or clubbing  ·  No lower extremity edema  ·  Skin: warm and dry  Neurological:  · Alert and oriented  · Moves all extremities well  · No abnormalities of mood, affect, memory, mentation, or behavior are noted    Data  EKG 5/27/2019:  Atrial fibrillation     Echo  5/28/2019:  Heart rhythm is irregular.   Left ventricular systolic function is normal with ejection fraction estimated at 55%.   No regional wall motion abnormalities.   Normal left ventricular diastolic filling pressure.   Mild mitral regurgitation.   Mild tricuspid regurgitation.   Systolic pulmonary artery pressure (SPAP) is normal estimated at 31 mmHg   (Right atrial pressure of 3 mmHg).   No intracardiac thrombus. All labs and testing reviewed. Lab Review     Renal Profile:   Lab Results   Component Value Date    CREATININE <0.5 05/29/2019    BUN 9 05/29/2019     05/29/2019    K 3.6 05/29/2019    K 3.2 05/28/2019     05/29/2019    CO2 25 05/29/2019     CBC:    Lab Results   Component Value Date    WBC 10.2 05/29/2019    RBC 4.48 05/29/2019    HGB 12.9 05/29/2019    HCT 38.2 05/29/2019    MCV 85.5 05/29/2019    RDW 12.7 05/29/2019     05/29/2019     BNP:  No results found for: BNP  Fasting Lipid Panel:    Lab Results   Component Value Date    CHOL 95 05/28/2019    HDL 46 05/28/2019    TRIG 53 05/28/2019     Cardiac Enzymes:  CK/MbTroponin  Lab Results   Component Value Date    TROPONINI <0.01 05/29/2019     PT/ INR No results found for: INR, PROTIME  PTT No results found for: PTT   Lab Results   Component Value Date    MG 1.50 05/28/2019    No results found for: TSH    Assessment:  1. Atrial fibrillation of unknown duration: ongoing   -HR controlled, s/p IV digoxin load 5/29   -[id number] score 1 (gender)  2. Nausea: improved   3. Gastric ulcer: noted on EGD 5/30  3. Hyperthyroidism: new diagnosis   4. Low BMI  5. Tobacco abuse: smoking cessation recommended   6. Unclear need for Adderall prior to admission but it was dc'd 5/30/2019    Plan:   1. Propranolol recommended due to AF in the setting of hyperthyroidism. Patient refusing due to nausea. 2. Change Cardizem to long acting 180mg po QD  3.  Continue therapeutic Lovenox and start Xarelto 20mg po QD if OK with GI (CrCl 75mL/min)  4. Will consider cardioversion if patient remains in afib after thyroid function normalizes. EP will sign off but remains available if needed. Office to arrange 4-6 week follow up.     Derek Cunningham, APRN-ERICA  [facility]  [phone]

## 2019-05-30 NOTE — PROGRESS NOTES
deformity. Skin: Skin color, texture, turgor normal.  No rashes or lesions. Neurologic:  Neurovascularly intact without any focal sensory/motor deficits. Cranial nerves: II-XII intact, grossly non-focal.  Psychiatric: Alert and oriented, thought content appropriate, normal insight  Capillary Refill: Brisk,< 3 seconds   Peripheral Pulses: +2 palpable, equal bilaterally       Labs:   Recent Labs     05/27/19  1544 05/28/19  0304 05/29/19  0724   WBC 14.1* 11.4* 10.2   HGB 16.3* 12.2 12.9   HCT 47.5 36.3 38.2    154 166     Recent Labs     05/27/19  1544 05/28/19  0304 05/29/19  0724   * 137 138   K 3.8 3.2* 3.6   CL 95* 105 102   CO2 23 24 25   BUN 16 8 9   CREATININE <0.5* <0.5* <0.5*   CALCIUM 10.0 8.3 8.6   PHOS  --  3.6  --      Recent Labs     05/27/19  1544   AST 17   ALT 22   BILITOT 0.8   ALKPHOS 109     No results for input(s): INR in the last 72 hours. Recent Labs     05/27/19  1544 05/29/19  1419   TROPONINI <0.01 <0.01       Urinalysis:      Lab Results   Component Value Date    NITRU Negative 05/27/2019    WBCUA 6-10 05/27/2019    BACTERIA 1+ 05/27/2019    RBCUA 0-2 05/27/2019    BLOODU Negative 05/27/2019    SPECGRAV <=1.005 05/27/2019    GLUCOSEU Negative 05/27/2019       Radiology:  CT ABDOMEN PELVIS W IV CONTRAST Additional Contrast? None   Final Result   Marked mural thickening of the gastric body compatible with gastritis. Definite ulceration not identified, but that can be difficult to visualized   with CT technique. Consider direct visualization. There is fluid-filled distention of small bowel in the left mid upper   abdomen, with evidence of mucosal hyperenhancement. Consequently, component   of enteritis is also considered. There is poor visualization of the proximal celiac and superior mesenteric   arteries, which is probably technical in nature. Correlate with any clinical   evidence acute and/or chronic mesenteric ischemia.   Consider further   evaluation with CT

## 2019-05-30 NOTE — PROGRESS NOTES
Arrived from inpatient hospital room via stretcher accompanied by transport staff  Pt alert and oriented x4  Calm/appropriate  VSS  Iv site assessed without evidence of infiltration on arrival  Respiration  easy unlabored - auscultated -clear bilaterally anterior and posterior fields/ heart rate - regular

## 2019-05-30 NOTE — PLAN OF CARE
Patient educated on use and side effects of Diltiazem. Patient expressed concerns of not knowing her medications on discharge.   Patient reassured that she will get a list of all medications that she will be going home as well as education on each one at discharge and as needed while here in the hospital.

## 2019-05-30 NOTE — OP NOTE
mg.    PROCEDURE DETAILS:  Informed consent was obtained after discussing  risks, benefits, and alternatives. A full history and physical was  performed. The patient was classified as ASA class III. Medications  were sequentially given to achieve adequate sedation. Cardiopulmonary  status was continuously monitored throughout the procedure. The patient  was placed in the left lateral decubitus position. Once the patient was  deemed to be adequately sedated, a standard upper gastroscope was  inserted into the mouth and advanced under direct visualization to the  second portion of the duodenum. The entire mucosa of esophagus, stomach  (retroflexed and forward views), duodenum (bulb, sweep and second  portion) were examined carefully during withdrawal.  The patient  tolerated the procedure well without any difficulties. FINDINGS:  ESOPHAGUS:  The entire esophagus appeared normal.  The Z-line was normal  without any evidence of inflammation, ulcers, strictures, or Lopez's. STOMACH:  There is a large 5 cm clean-based ulcer with pigmented spots. This appears to be chronic in nature. Biopsies were taken. There was  no active bleeding. Retroflex view of the stomach was normal.  The  ulcer was in the antrum. DUODENUM:  Examined portion of the duodenum appeared normal.    SUMMARY:  1.  Large 5 cm clean-based ulcer with pigmented spots in the antrum. 2.  No active bleeding. RECOMMENDATIONS:  1. Return the patient to the floor for continuous medical care. 2.  Await pathology results. 3.  PPI b.i.d. for 8 weeks. 4.  Repeat EGD in 8 weeks to confirm healing. 5.  Avoid NSAIDs. 6.  Check H. pylori stool antigen. 7.  Resume diet. 8.  She will need outpatient screening colonoscopy as well. Earvin Brunner, MD    D: 05/30/2019 9:11:07       T: 05/30/2019 13:43:03     [email or web address]  Job#: [id number]     Doc#: [id number]    CC:   MD Lakesha Chawla MD

## 2019-05-30 NOTE — CONSULTS
History and Physical / Pre-Sedation Assessment    Patient:  Tay Alston   :   1965     Intended Procedure:  EGD    HPI: 46year old female with history of depression admitted with new onset A fib and intractable nausea and vomiting. CT showed possible gastritis    Past Medical History:   Diagnosis Date    Depression      Past Surgical History:   Procedure Laterality Date    ANKLE SURGERY      ANKLE SURGERY      MOUTH SURGERY      WISDOM TOOTH EXTRACTION         Medications reviewed  Prior to Admission medications    Medication Sig Start Date End Date Taking? Authorizing Provider   amphetamine-dextroamphetamine (ADDERALL) 20 MG tablet Take 20 mg by mouth daily   Yes Historical Provider, MD   citalopram (CELEXA) 20 MG tablet Take 20 mg by mouth daily   Yes Historical Provider, MD   ALPRAZolam (XANAX) 0.5 MG tablet Take 0.5 mg by mouth nightly as needed for Sleep   Yes Historical Provider, MD   OXcarbazepine (TRILEPTAL) 150 MG tablet Take 400 mg by mouth daily     Historical Provider, MD        Allergies: Allergies   Allergen Reactions    Aspirin     Codeine     Penicillins     [employer]        Nurses notes reviewed and agreed. Physical Exam:  Vital Signs: /67   Pulse 82   Temp 98.2 °F (36.8 °C) (Oral)   Resp 18   Ht 5' 1\" (1.549 m)   Wt 84 lb (38.1 kg)   LMP 2012   SpO2 96%   BMI 15.87 kg/m²    Airway: Mallampati: II (soft palate, uvula, fauces visible)  Pulmonary:Normal  Cardiac:Normal  Abdomen:Normal    Pre-Procedure Assessment / Plan:  ASA: Class 2 - A normal healthy patient with mild systemic disease  Level of Sedation Plan: Moderate sedation  Post Procedure plan: Return to same level of care    I assessed the patient and find that the patient is in satisfactory condition to proceed with the planned procedure and sedation plan. I have explained the risk, benefits, and alternatives to the procedure; the patient understands and agrees to proceed.          Cuba Sykes Rupinder Bentley  5/30/2019

## 2019-05-31 VITALS
HEIGHT: 61 IN | SYSTOLIC BLOOD PRESSURE: 109 MMHG | RESPIRATION RATE: 15 BRPM | DIASTOLIC BLOOD PRESSURE: 61 MMHG | HEART RATE: 73 BPM | BODY MASS INDEX: 15.86 KG/M2 | OXYGEN SATURATION: 96 % | TEMPERATURE: 97.5 F | WEIGHT: 84 LBS

## 2019-05-31 PROCEDURE — 6370000000 HC RX 637 (ALT 250 FOR IP): Performed by: NURSE PRACTITIONER

## 2019-05-31 PROCEDURE — C9113 INJ PANTOPRAZOLE SODIUM, VIA: HCPCS | Performed by: NURSE PRACTITIONER

## 2019-05-31 PROCEDURE — 6370000000 HC RX 637 (ALT 250 FOR IP): Performed by: INTERNAL MEDICINE

## 2019-05-31 PROCEDURE — 6360000002 HC RX W HCPCS: Performed by: NURSE PRACTITIONER

## 2019-05-31 RX ORDER — METHIMAZOLE 5 MG/1
5 TABLET ORAL 3 TIMES DAILY
Qty: 90 TABLET | Refills: 4 | Status: SHIPPED | OUTPATIENT
Start: 2019-05-31 | End: 2019-06-30

## 2019-05-31 RX ORDER — PANTOPRAZOLE SODIUM 40 MG/1
40 TABLET, DELAYED RELEASE ORAL
Qty: 60 TABLET | Refills: 0 | Status: SHIPPED | OUTPATIENT
Start: 2019-05-31 | End: 2019-06-17 | Stop reason: SDUPTHER

## 2019-05-31 RX ORDER — DILTIAZEM HYDROCHLORIDE 180 MG/1
180 CAPSULE, COATED, EXTENDED RELEASE ORAL DAILY
Qty: 30 CAPSULE | Refills: 3 | Status: SHIPPED | OUTPATIENT
Start: 2019-06-01 | End: 2020-04-28 | Stop reason: SDUPTHER

## 2019-05-31 RX ADMIN — METHIMAZOLE 5 MG: 5 TABLET ORAL at 07:54

## 2019-05-31 RX ADMIN — DILTIAZEM HYDROCHLORIDE 180 MG: 180 CAPSULE, EXTENDED RELEASE ORAL at 07:54

## 2019-05-31 RX ADMIN — PANTOPRAZOLE SODIUM 40 MG: 40 INJECTION, POWDER, FOR SOLUTION INTRAVENOUS at 07:54

## 2019-05-31 NOTE — PROGRESS NOTES
Nargis Velasquez is a 46 y.o. female patient. No current facility-administered medications for this encounter. Current Outpatient Medications   Medication Sig Dispense Refill    [START ON 6/1/2019] diltiazem (CARDIZEM CD) 180 MG extended release capsule Take 1 capsule by mouth daily 30 capsule 3    methimazole (TAPAZOLE) 5 MG tablet Take 1 tablet by mouth 3 times daily 90 tablet 4    pantoprazole (PROTONIX) 40 MG tablet Take 1 tablet by mouth 2 times daily (before meals) 60 tablet 0    citalopram (CELEXA) 20 MG tablet Take 20 mg by mouth daily      ALPRAZolam (XANAX) 0.5 MG tablet Take 0.5 mg by mouth nightly as needed for Sleep      OXcarbazepine (TRILEPTAL) 150 MG tablet Take 400 mg by mouth daily        Allergies   Allergen Reactions    Aspirin     Codeine     Penicillins     Poultry Meal      Active Problems:    Paroxysmal atrial fibrillation (HCC)    Hyperthyroidism  Resolved Problems:    * No resolved hospital problems. *    Blood pressure 109/61, pulse 73, temperature 97.5 °F (36.4 °C), temperature source Axillary, resp. rate 15, height 5' 1\" (1.549 m), weight 84 lb (38.1 kg), last menstrual period 11/26/2012, SpO2 96 %. Subjective:  Symptoms:  Improved. Pain:  She reports no pain. Objective:  General Appearance: In no acute distress and not in pain. Vital signs: (most recent): Blood pressure 109/61, pulse 73, temperature 97.5 °F (36.4 °C), temperature source Axillary, resp. rate 15, height 5' 1\" (1.549 m), weight 84 lb (38.1 kg), last menstrual period 11/26/2012, SpO2 96 %. Abdomen: Abdomen is soft. Bowel sounds are normal.   There is no abdominal tenderness. Assessment & Plan  46year old female with history of depression admitted with new onset A fib and intractable nausea and vomiting. CT showed possible gastritis  EGD w large 5 cm antral ulcer and neg.  Bx.  - PPI bid  - Needs re-EGD in 6-8 weeks    Yari Diaz MD       [phone]          [place] Scale,

## 2019-05-31 NOTE — PROGRESS NOTES
Page sent to Dr. Shauna Cardona to see if patient can do the H. Pylori stool sample outpatient or if they want it prior to discharge.

## 2019-05-31 NOTE — DISCHARGE SUMMARY
Wt 84 lb (38.1 kg)   LMP 11/26/2012   SpO2 96%   BMI 15.87 kg/m²   General appearance:Frail, thin in NAD   HEENT: Pupils equal, round, and reactive to light. Conjunctivae/corneas clear. Neck: Supple, with full range of motion. No jugular venous distention. Trachea midline. Respiratory:  Normal respiratory effort. Clear to auscultation, bilaterally without Rales/Wheezes/Rhonchi. Cardiovascular: Regular rate and irregular rhythm with normal S1/S2 without murmurs, rubs or gallops. Abdomen: Soft, non-tender, non-distended with normal bowel sounds. Musculoskeletal: No clubbing, cyanosis or edema bilaterally. Full range of motion without deformity. Skin: Skin color, texture, turgor normal.  No rashes or lesions. Neurologic:  Neurovascularly intact without any focal sensory/motor deficits. Cranial nerves: II-XII intact, grossly non-focal.  Psychiatric: Alert and oriented, thought content appropriate, normal insight  Capillary Refill: Brisk,< 3 seconds   Peripheral Pulses: +2 palpable, equal bilaterally            CBC:    Lab Results   Component Value Date    WBC 10.2 05/29/2019    HGB 12.9 05/29/2019    HCT 38.2 05/29/2019     05/29/2019       Renal:    Lab Results   Component Value Date     05/29/2019    K 3.6 05/29/2019    K 3.2 05/28/2019     05/29/2019    CO2 25 05/29/2019    BUN 9 05/29/2019    CREATININE <0.5 05/29/2019    CALCIUM 8.6 05/29/2019    PHOS 3.6 05/28/2019         Significant Diagnostic Studies    Radiology:   CT ABDOMEN PELVIS W IV CONTRAST Additional Contrast? None   Final Result   Marked mural thickening of the gastric body compatible with gastritis. Definite ulceration not identified, but that can be difficult to visualized   with CT technique. Consider direct visualization. There is fluid-filled distention of small bowel in the left mid upper   abdomen, with evidence of mucosal hyperenhancement. Consequently, component   of enteritis is also considered. There is poor visualization of the proximal celiac and superior mesenteric   arteries, which is probably technical in nature. Correlate with any clinical   evidence acute and/or chronic mesenteric ischemia. Consider further   evaluation with CT angiography to better evaluate those and to evaluate for   the possibility of any stenosis. XR CHEST STANDARD (2 VW)   Final Result   No acute process. Consults:     IP CONSULT TO HOSPITALIST  IP CONSULT TO CARDIOLOGY  IP CONSULT TO SPIRITUAL SERVICES  IP CONSULT TO GI    Disposition:  Home     Condition at Discharge: Stable    Discharge Instructions/Follow-up:    PCP    Code Status:  Full Code     Activity: activity as tolerated    Diet: regular diet      Discharge Medications:     Current Discharge Medication List           Details   diltiazem (CARDIZEM CD) 180 MG extended release capsule Take 1 capsule by mouth daily  Qty: 30 capsule, Refills: 3      methimazole (TAPAZOLE) 5 MG tablet Take 1 tablet by mouth 3 times daily  Qty: 90 tablet, Refills: 4      pantoprazole (PROTONIX) 40 MG tablet Take 1 tablet by mouth 2 times daily (before meals)  Qty: 60 tablet, Refills: 0              Details   citalopram (CELEXA) 20 MG tablet Take 20 mg by mouth daily      ALPRAZolam (XANAX) 0.5 MG tablet Take 0.5 mg by mouth nightly as needed for Sleep      OXcarbazepine (TRILEPTAL) 150 MG tablet Take 400 mg by mouth daily              Time Spent on discharge is more than 30 minutes in the examination, evaluation, counseling and review of medications and discharge plan. Signed:    ANALIA Mina CNP   5/31/2019      Thank you No primary care provider on file. for the opportunity to be involved in this patient's care. If you have any questions or concerns please feel free to contact me at [phone].

## 2019-05-31 NOTE — CARE COORDINATION
SHEKHAR HIRSCH contacted by Belinda Shah CNP to make a new Pt appt. For the Pt with a [id number] [facility] PCP. Pt has pending medicaid insurance at this time. This RN CM contacted the [facility] and message left ( they are closed on fridays) for them to call the Pt on Monday to arrange follow up appt. [facility] handout given to the Pt with clinic instructions. Pt advised to contact the clinic on Monday afternoon if she has not received a call back to arrange the appt. Pt verbalized understanding and denies additional needs. Pts ride is on the way to pick her up at this time.

## 2019-06-17 ENCOUNTER — HOSPITAL ENCOUNTER (OUTPATIENT)
Age: 54
Discharge: HOME | End: 2019-06-17
Payer: MEDICAID

## 2019-06-17 ENCOUNTER — OFFICE VISIT (OUTPATIENT)
Dept: INTERNAL MEDICINE CLINIC | Age: 54
End: 2019-06-17

## 2019-06-17 VITALS
DIASTOLIC BLOOD PRESSURE: 60 MMHG | HEART RATE: 80 BPM | OXYGEN SATURATION: 98 % | BODY MASS INDEX: 16.05 KG/M2 | HEIGHT: 61 IN | SYSTOLIC BLOOD PRESSURE: 100 MMHG | WEIGHT: 85 LBS

## 2019-06-17 DIAGNOSIS — R79.0 LOW MAGNESIUM LEVEL: ICD-10-CM

## 2019-06-17 DIAGNOSIS — I48.0 PAROXYSMAL ATRIAL FIBRILLATION (HCC): ICD-10-CM

## 2019-06-17 DIAGNOSIS — G43.709 CHRONIC MIGRAINE WITHOUT AURA WITHOUT STATUS MIGRAINOSUS, NOT INTRACTABLE: ICD-10-CM

## 2019-06-17 DIAGNOSIS — E05.90 HYPERTHYROIDISM: ICD-10-CM

## 2019-06-17 DIAGNOSIS — E05.90 HYPERTHYROIDISM: Primary | ICD-10-CM

## 2019-06-17 DIAGNOSIS — K25.3 ACUTE GASTRIC ULCER WITHOUT HEMORRHAGE OR PERFORATION: ICD-10-CM

## 2019-06-17 LAB
MAGNESIUM: 2.2 MG/DL (ref 1.8–2.4)
T4 FREE: 4.4 NG/DL (ref 0.9–1.8)

## 2019-06-17 PROCEDURE — 84439 ASSAY OF FREE THYROXINE: CPT

## 2019-06-17 PROCEDURE — 99204 OFFICE O/P NEW MOD 45 MIN: CPT | Performed by: INTERNAL MEDICINE

## 2019-06-17 PROCEDURE — 83735 ASSAY OF MAGNESIUM: CPT

## 2019-06-17 PROCEDURE — 36415 COLL VENOUS BLD VENIPUNCTURE: CPT

## 2019-06-17 PROCEDURE — 93000 ELECTROCARDIOGRAM COMPLETE: CPT | Performed by: INTERNAL MEDICINE

## 2019-06-17 PROCEDURE — 84445 ASSAY OF TSI GLOBULIN: CPT

## 2019-06-17 RX ORDER — PROPRANOLOL HYDROCHLORIDE 80 MG/1
80 CAPSULE, EXTENDED RELEASE ORAL DAILY
Qty: 30 CAPSULE | Refills: 3 | Status: SHIPPED | OUTPATIENT
Start: 2019-06-17 | End: 2020-04-28 | Stop reason: SDUPTHER

## 2019-06-17 RX ORDER — PANTOPRAZOLE SODIUM 40 MG/1
40 TABLET, DELAYED RELEASE ORAL DAILY
Qty: 30 TABLET | Refills: 3 | Status: SHIPPED
Start: 2019-06-17 | End: 2019-07-16 | Stop reason: SDUPTHER

## 2019-06-17 NOTE — PROGRESS NOTES
SUBJECTIVE:   New patient to clinic. Hospital follow up from inpatient stay May 27 - 31 with new onset arial fibrillation with rapid ventricular response (Echo EF 55% no RWMA) and hyperthyroidism. Started on methimazole 5 mg TID for presumed Grave's hyperthyroid. [id number] score 1 (gender). Plans to start NOAC however pt was found to have GI Ulcer on EGD, GI rec to hold anticoagulatoin for 2 weeks. Continues on pantoprazole 40 mg BID. No known history of gastritis, dyspepsia, or gastric reflux. Drinks alcohol only occasionally. She has taken ibuprofen up to 10 per day for tooth pain. She was seen by dentist and teeth pulled two years ago. She was taking ibuprofen until admission for left sided headaches. She is unable to see out of her left eye for the past six months. She has lost weight which she attributes to vomiting. Also underweight BMI 15.9 attributed to Adderall and hyperthyroidism. Medicaid pending. Will be seen by cardiology on June 27. Adderall, which was stopped at discharge. She is followed by Dr. Nehal Lo, psychiatry. She hadbeen taking Adderall for the past five years. Seen by Dr. Leanne Savage last week and started back on Adderall at a lower dose of 10 mg BID. She had been taking 20 mg BID. Treated with Trileptal for bipolar, dose decreased from 400 mg to 300 mg once per day.        MEDICATIONS:  diltiazem (CARDIZEM CD) 180 MG ER Take 1 capsule by mouth daily (discontinue)   methimazole (TAPAZOLE) 5 MG tablet Take 1 tablet by mouth 3 times daily   pantoprazole (PROTONIX) 40 MG tablet Take 1 tablet by mouth 2 times daily   OXcarbazepine (TRILEPTAL) 150 MG  Take 300 mg by mouth daily    citalopram (CELEXA) 20 MG tablet Take 20 mg by mouth daily   ALPRAZolam (XANAX) 0.5 MG tablet Take 0.5 mg by mouth nightly as needed for Sleep     LABS: 05/29/2019  WBC 10.2   HGB 12.9      MCV 85.5        K 3.6      CO2 25   BUN 9   CREATININE <0.5 (L)   GLUCOSE 100 (H) EGD large 5cm gastric antral ulcer. H pylori stool antigen pending    Stomach, ulcer, biopsy (5/20/19)     - Antral and transitional mucosa with reactive change.     - Separate fragments of acute inflammatory exudate and luminal debris.     - No Helicobacter-like organisms identified on H&E-stained sections. Free T4 (5/28) 4.3 ng/dL  TSH (5/28) < 0.01 uIU/mL  Magnesium (5/28) 1.50 mg/dL    CT abd/pelvis (5/27) Marked mural thickening of the gastric body compatible with gastritis. Definite ulceration not identified, but that can be difficult to visualized with CT technique.  Consider direct visualization. There is fluid-filled distention of small bowel in the left mid upper abdomen, with evidence of mucosal hyperenhancement.  Consequently, component of enteritis is also considered. There is poor visualization of the proximal celiac and superior mesenteric arteries, which is probably technical in nature.  Correlate with any clinical evidence acute and/or chronic mesenteric ischemia.  Consider further evaluation with CT angiography to better evaluate those and to evaluate for the possibility of any stenosis. Transthoracic echocardiogram (5/27) Heart rhythm is irregular. Left ventricular systolic function is normal with ejection fraction estimated at 55%.  No regional wall motion abnormalities. Normal left ventricular diastolic filling pressure.  Mild mitral regurgitation. Mild tricuspid regurgitation. Systolic pulmonary artery pressure (SPAP) is normal estimated at 31 mmHg (Right atrial pressure of 3 mmHg).  No intracardiac thrombus.     OBJECTIVE:    /60   Pulse 80   Ht 5' 1\" (1.549 m)   Wt 85 lb (38.6 kg)   LMP 11/26/2012   SpO2 98%   BMI 16.06 kg/m²   HEENT:  Oropharynx clear, no lymphadenopathy,   LUNGS:  Clear and without wheezes  HEART:  Regular rhythm, no appreciable murmur  ABD:  Benign, active bowel sounds  EXT:  No edema, pulses palpable  NEURO:  No focal neurologic deficits noted.    EKG:  Sinus rhythm, HR 78, no ST changes. ASSESSMENT / PLAN:   1. Hyperthyroid:   Possible Grave's disease. We will check a blood test for thyroid stimulating Immuglobin. We will recheck free T4 after taking methimazole three weeks. We will check a thyroid ultrasound. We will check a CBC (blood count) and liver enzymes on the methimazole. 2. Gastric ulcer:   Continue taking pantoprazole (Prototonix) 40 mg ONCE per day in the morning. 3. Low magnesium: We will recheck today and replace if remains low. 4. Paroxysmal atrial fibrillation: We will check an EKG in the office today. Instead of diltiazem (Cardizem), with the hyperthyroid we prescribe a beta-blocker. START taking propranolol 80 mg daily. We will discuss appropriate anti-coagulation for non-valvular atrial fibrillation at next visit if not addressed by cardiology. A direct oral anticoagulant would be expected. 5. Migraines: These should be reduced with propranolol for heart rate. Avoid taking any ibuprofen or naproxen or another anti-inflammatory. Take acetaminophen (Tylenol) up to 1000 mg at a time up to 3000 mg a day.         Follow-up in three weeks after seen by cardiology  LABS:  Thyroid stimulating antibody, Free T4, CBC (blood count), and CMP (comprehensive metabolic panel)  Radiology:  Thyroid ultrasound

## 2019-06-19 ENCOUNTER — HOSPITAL ENCOUNTER (OUTPATIENT)
Dept: ULTRASOUND IMAGING | Age: 54
Discharge: HOME | End: 2019-06-19
Payer: MEDICAID

## 2019-06-19 DIAGNOSIS — E05.90 HYPERTHYROIDISM: ICD-10-CM

## 2019-06-19 LAB — THYROID STIMULATING IMMUNOGLOBULIN: 386 %

## 2019-06-19 PROCEDURE — 76536 US EXAM OF HEAD AND NECK: CPT

## 2019-06-27 PROBLEM — Z72.0 TOBACCO ABUSE: Status: ACTIVE | Noted: 2019-06-27

## 2019-07-08 ENCOUNTER — OFFICE VISIT (OUTPATIENT)
Dept: INTERNAL MEDICINE CLINIC | Age: 54
End: 2019-07-08

## 2019-07-08 ENCOUNTER — HOSPITAL ENCOUNTER (OUTPATIENT)
Age: 54
Discharge: HOME | End: 2019-07-08
Payer: MEDICAID

## 2019-07-08 VITALS
DIASTOLIC BLOOD PRESSURE: 60 MMHG | WEIGHT: 85 LBS | HEIGHT: 61 IN | HEART RATE: 75 BPM | SYSTOLIC BLOOD PRESSURE: 100 MMHG | BODY MASS INDEX: 16.05 KG/M2 | OXYGEN SATURATION: 97 %

## 2019-07-08 DIAGNOSIS — E05.90 HYPERTHYROIDISM: ICD-10-CM

## 2019-07-08 DIAGNOSIS — E05.90 HYPERTHYROIDISM: Primary | ICD-10-CM

## 2019-07-08 DIAGNOSIS — G43.709 CHRONIC MIGRAINE WITHOUT AURA WITHOUT STATUS MIGRAINOSUS, NOT INTRACTABLE: ICD-10-CM

## 2019-07-08 DIAGNOSIS — I48.0 PAROXYSMAL ATRIAL FIBRILLATION (HCC): ICD-10-CM

## 2019-07-08 DIAGNOSIS — F31.9 BIPOLAR 1 DISORDER (HCC): ICD-10-CM

## 2019-07-08 LAB
A/G RATIO: 1.2 (ref 1.1–2.2)
ALBUMIN SERPL-MCNC: 3.6 G/DL (ref 3.4–5)
ALP BLD-CCNC: 120 U/L (ref 40–129)
ALT SERPL-CCNC: 25 U/L (ref 10–40)
ANION GAP SERPL CALCULATED.3IONS-SCNC: 15 MMOL/L (ref 3–16)
AST SERPL-CCNC: 26 U/L (ref 15–37)
BASOPHILS ABSOLUTE: 0 K/UL (ref 0–0.2)
BASOPHILS RELATIVE PERCENT: 0.8 %
BILIRUB SERPL-MCNC: <0.2 MG/DL (ref 0–1)
BUN BLDV-MCNC: 9 MG/DL (ref 7–20)
CALCIUM SERPL-MCNC: 8.9 MG/DL (ref 8.3–10.6)
CHLORIDE BLD-SCNC: 104 MMOL/L (ref 99–110)
CO2: 21 MMOL/L (ref 21–32)
CREAT SERPL-MCNC: <0.5 MG/DL (ref 0.6–1.1)
EOSINOPHILS ABSOLUTE: 0.3 K/UL (ref 0–0.6)
EOSINOPHILS RELATIVE PERCENT: 4.9 %
GFR AFRICAN AMERICAN: >60
GFR NON-AFRICAN AMERICAN: >60
GLOBULIN: 2.9 G/DL
GLUCOSE BLD-MCNC: 126 MG/DL (ref 70–99)
HCT VFR BLD CALC: 40.6 % (ref 36–48)
HEMOGLOBIN: 13.6 G/DL (ref 12–16)
LYMPHOCYTES ABSOLUTE: 2.8 K/UL (ref 1–5.1)
LYMPHOCYTES RELATIVE PERCENT: 49.3 %
MCH RBC QN AUTO: 29 PG (ref 26–34)
MCHC RBC AUTO-ENTMCNC: 33.4 G/DL (ref 31–36)
MCV RBC AUTO: 86.8 FL (ref 80–100)
MONOCYTES ABSOLUTE: 0.3 K/UL (ref 0–1.3)
MONOCYTES RELATIVE PERCENT: 6 %
NEUTROPHILS ABSOLUTE: 2.2 K/UL (ref 1.7–7.7)
NEUTROPHILS RELATIVE PERCENT: 39 %
PDW BLD-RTO: 13.4 % (ref 12.4–15.4)
PLATELET # BLD: 207 K/UL (ref 135–450)
PMV BLD AUTO: 9.6 FL (ref 5–10.5)
POTASSIUM SERPL-SCNC: 4 MMOL/L (ref 3.5–5.1)
RBC # BLD: 4.67 M/UL (ref 4–5.2)
SODIUM BLD-SCNC: 140 MMOL/L (ref 136–145)
TOTAL PROTEIN: 6.5 G/DL (ref 6.4–8.2)
WBC # BLD: 5.8 K/UL (ref 4–11)

## 2019-07-08 PROCEDURE — 85025 COMPLETE CBC W/AUTO DIFF WBC: CPT

## 2019-07-08 PROCEDURE — 80053 COMPREHEN METABOLIC PANEL: CPT

## 2019-07-08 PROCEDURE — 36415 COLL VENOUS BLD VENIPUNCTURE: CPT

## 2019-07-08 PROCEDURE — 99214 OFFICE O/P EST MOD 30 MIN: CPT | Performed by: INTERNAL MEDICINE

## 2019-07-08 ASSESSMENT — PATIENT HEALTH QUESTIONNAIRE - PHQ9
SUM OF ALL RESPONSES TO PHQ QUESTIONS 1-9: 2
SUM OF ALL RESPONSES TO PHQ9 QUESTIONS 1 & 2: 2
1. LITTLE INTEREST OR PLEASURE IN DOING THINGS: 1
2. FEELING DOWN, DEPRESSED OR HOPELESS: 1
SUM OF ALL RESPONSES TO PHQ QUESTIONS 1-9: 2

## 2019-07-08 NOTE — PROGRESS NOTES
SUBJECTIVE:   Follow up from 6/17 for hypothyroid, gastric ulcer, and paroxysmal atrial fibrillation. Sinus rhythm on EKG. She changed to caffeine free coffee. History of present illness:  Hospital follow up from inpatient stay May 27 - 31 with new onset arial fibrillation with rapid ventricular response (Echo EF 55% no RWMA) and hyperthyroidism. Started on methimazole 5 mg TID for presumed Grave's hyperthyroid. [id number] score 1 (gender). Plans to start NOAC however pt was found to have GI Ulcer on EGD, GI rec to hold anticoagulatoin for 2 weeks. Continues on pantoprazole 40 mg BID. No known history of gastritis, dyspepsia, or gastric reflux. Drinks alcohol only occasionally. She has taken ibuprofen up to 10 per day for tooth pain. She was seen by dentist and teeth pulled two years ago. She was taking ibuprofen until admission for left sided headaches. She is unable to see out of her left eye for the past six months. She has lost weight which she attributes to vomiting.       Also underweight BMI 15.9 attributed to Adderall and hyperthyroidism. Medicaid pending. Will be seen by cardiology on June 27. Adderall, which was stopped at discharge. She is followed by Dr. Hermelinda Antunez, psychiatry. She had been taking Adderall for the past five years. Seen by Dr. Paco Banegas prior to admission and started back on Adderall at a lower dose of 10 mg BID. She had been taking 20 mg BID. Treated with Trileptal for bipolar, dose decreased from 400 mg to 300 mg once per day.        MEDICATIONS:  pantoprazole (PROTONIX) 40 MG tablet Take 1 tablet by mouth daily   propranolol (INDERAL LA) 80 MG Take 1 capsule by mouth daily   diltiazem (CARDIZEM CD) 180 MG extended release capsule Take 1 capsule by mouth daily   OXcarbazepine (TRILEPTAL) 150 MG tablet Take 300 mg by mouth daily    citalopram (CELEXA) 20 MG tablet Take 20 mg by mouth daily   ALPRAZolam (XANAX) 0.5 MG tablet Take 0.5 mg by mouth nightly as

## 2019-07-16 ENCOUNTER — OFFICE VISIT (OUTPATIENT)
Dept: INTERNAL MEDICINE CLINIC | Age: 54
End: 2019-07-16

## 2019-07-16 VITALS
HEART RATE: 73 BPM | HEIGHT: 61 IN | DIASTOLIC BLOOD PRESSURE: 60 MMHG | OXYGEN SATURATION: 98 % | WEIGHT: 88 LBS | BODY MASS INDEX: 16.62 KG/M2 | SYSTOLIC BLOOD PRESSURE: 106 MMHG

## 2019-07-16 DIAGNOSIS — T39.395A GASTRIC ULCER DUE TO NONSTEROIDAL ANTI-INFLAMMATORY DRUG (NSAID): Chronic | ICD-10-CM

## 2019-07-16 DIAGNOSIS — Z12.11 SCREEN FOR COLON CANCER: ICD-10-CM

## 2019-07-16 DIAGNOSIS — R73.09 ABNORMAL GLUCOSE: Primary | ICD-10-CM

## 2019-07-16 DIAGNOSIS — K25.9 GASTRIC ULCER DUE TO NONSTEROIDAL ANTI-INFLAMMATORY DRUG (NSAID): Chronic | ICD-10-CM

## 2019-07-16 LAB — HBA1C MFR BLD: 5.6 %

## 2019-07-16 PROCEDURE — 99203 OFFICE O/P NEW LOW 30 MIN: CPT | Performed by: INTERNAL MEDICINE

## 2019-07-16 PROCEDURE — 43235 EGD DIAGNOSTIC BRUSH WASH: CPT | Performed by: INTERNAL MEDICINE

## 2019-07-16 PROCEDURE — 83036 HEMOGLOBIN GLYCOSYLATED A1C: CPT | Performed by: INTERNAL MEDICINE

## 2019-07-16 RX ORDER — PANTOPRAZOLE SODIUM 40 MG/1
40 TABLET, DELAYED RELEASE ORAL DAILY
Qty: 30 TABLET | Refills: 3 | Status: ON HOLD | OUTPATIENT
Start: 2019-07-16 | End: 2019-08-14 | Stop reason: HOSPADM

## 2019-07-16 NOTE — PROGRESS NOTES
Gastroenterology Consult Note    Patient:   Ari Mckinnon   YOB: 1965   Facility:   [facility]   Referring/PCP: Naina Ochoa MD  Date:     7/16/2019  Consultant:   López Holguin MD    Subjective: This 53 y.o. female  is seen in consultation regarding \"follow up Gastric ulcer\". Information was obtained from interview of  the patient, examination of the patient, and review of records. I did  update the past medical, surgical, social and / or family history. Patient presented ~3 months ago with vomiting and Esophagogastroduodenoscopy demonstrated a 5 cm Gastric ulcer, Helicobacter Pylori negative, ulcer being attributed to NSAIDs. She continues to smoke; does not take NSAIDs. She reports feeling much better. With regards to weight, she reports gained 4 lbs over couple weeks. Review of available records reveals:  07/16/19 88 lb (39.9 kg)   07/08/19 85 lb (38.6 kg)   06/17/19 85 lb (38.6 kg)   05/27/19 84 lb (38.1 kg)   05/05/16 99 lb (44.9 kg)       Prior to Admission medications    Medication Sig Start Date End Date Taking? Authorizing Provider   pantoprazole (PROTONIX) 40 MG tablet Take 1 tablet by mouth daily 6/17/19  Yes Yolanda Singh MD   propranolol (INDERAL LA) 80 MG extended release capsule Take 1 capsule by mouth daily 6/17/19  Yes Yolanda Singh MD   diltiazem (CARDIZEM CD) 180 MG extended release capsule Take 1 capsule by mouth daily 6/1/19  Yes [place] Nurse, APRN - CNP   OXcarbazepine (TRILEPTAL) 150 MG tablet Take 300 mg by mouth daily    Yes Historical Provider, MD   citalopram (CELEXA) 20 MG tablet Take 20 mg by mouth daily   Yes Historical Provider, MD   ALPRAZolam [place]) 0.5 MG tablet Take 0.5 mg by mouth nightly as needed for Sleep. Only takes prn    Historical Provider, MD        Allergies:    Allergies   Allergen Reactions    Aspirin     Codeine     Penicillins     Poultry Meal        Past Medical History:

## 2019-08-05 ENCOUNTER — OFFICE VISIT (OUTPATIENT)
Dept: INTERNAL MEDICINE CLINIC | Age: 54
End: 2019-08-05

## 2019-08-05 VITALS
BODY MASS INDEX: 16.8 KG/M2 | DIASTOLIC BLOOD PRESSURE: 68 MMHG | WEIGHT: 89 LBS | HEIGHT: 61 IN | HEART RATE: 70 BPM | OXYGEN SATURATION: 97 % | SYSTOLIC BLOOD PRESSURE: 122 MMHG

## 2019-08-05 DIAGNOSIS — T39.395A GASTRIC ULCER DUE TO NONSTEROIDAL ANTI-INFLAMMATORY DRUG (NSAID): ICD-10-CM

## 2019-08-05 DIAGNOSIS — G43.709 CHRONIC MIGRAINE WITHOUT AURA WITHOUT STATUS MIGRAINOSUS, NOT INTRACTABLE: ICD-10-CM

## 2019-08-05 DIAGNOSIS — E05.90 HYPERTHYROIDISM: Primary | ICD-10-CM

## 2019-08-05 DIAGNOSIS — J43.9 PULMONARY EMPHYSEMA, UNSPECIFIED EMPHYSEMA TYPE (HCC): ICD-10-CM

## 2019-08-05 DIAGNOSIS — K25.9 GASTRIC ULCER DUE TO NONSTEROIDAL ANTI-INFLAMMATORY DRUG (NSAID): ICD-10-CM

## 2019-08-05 DIAGNOSIS — F31.9 BIPOLAR 1 DISORDER (HCC): ICD-10-CM

## 2019-08-05 DIAGNOSIS — I48.0 PAROXYSMAL ATRIAL FIBRILLATION (HCC): ICD-10-CM

## 2019-08-05 LAB
EXPIRATORY TIME: NORMAL SEC
FEF 25-75% %PRED-PRE: NORMAL L/SEC
FEF 25-75% PRED: NORMAL L/SEC
FEF 25-75%-PRE: NORMAL L/SEC
FEV1 %PRED-PRE: 53 %
FEV1 PRED: NORMAL L
FEV1/FVC %PRED-PRE: NORMAL %
FEV1/FVC PRED: NORMAL %
FEV1/FVC: 45 %
FEV1: NORMAL L
FVC %PRED-PRE: NORMAL %
FVC PRED: NORMAL L
FVC: NORMAL L
PEF %PRED-PRE: NORMAL L/SEC
PEF PRED: NORMAL L/SEC
PEF-PRE: NORMAL L/SEC

## 2019-08-05 PROCEDURE — 99214 OFFICE O/P EST MOD 30 MIN: CPT | Performed by: INTERNAL MEDICINE

## 2019-08-05 ASSESSMENT — PULMONARY FUNCTION TESTS
FEV1_PERCENT_PREDICTED_PRE: 53
FEV1/FVC: 45

## 2019-08-08 ENCOUNTER — OFFICE VISIT (OUTPATIENT)
Dept: CARDIOLOGY CLINIC | Age: 54
End: 2019-08-08
Payer: MEDICAID

## 2019-08-08 VITALS
HEIGHT: 61 IN | OXYGEN SATURATION: 99 % | DIASTOLIC BLOOD PRESSURE: 60 MMHG | SYSTOLIC BLOOD PRESSURE: 100 MMHG | HEART RATE: 58 BPM | BODY MASS INDEX: 16.77 KG/M2 | WEIGHT: 88.8 LBS

## 2019-08-08 DIAGNOSIS — Z72.0 TOBACCO ABUSE: ICD-10-CM

## 2019-08-08 DIAGNOSIS — I48.0 PAROXYSMAL ATRIAL FIBRILLATION (HCC): Primary | ICD-10-CM

## 2019-08-08 PROCEDURE — G8419 CALC BMI OUT NRM PARAM NOF/U: HCPCS | Performed by: NURSE PRACTITIONER

## 2019-08-08 PROCEDURE — G8427 DOCREV CUR MEDS BY ELIG CLIN: HCPCS | Performed by: NURSE PRACTITIONER

## 2019-08-08 PROCEDURE — 99213 OFFICE O/P EST LOW 20 MIN: CPT | Performed by: NURSE PRACTITIONER

## 2019-08-08 PROCEDURE — 3017F COLORECTAL CA SCREEN DOC REV: CPT | Performed by: NURSE PRACTITIONER

## 2019-08-08 PROCEDURE — 4004F PT TOBACCO SCREEN RCVD TLK: CPT | Performed by: NURSE PRACTITIONER

## 2019-08-08 PROCEDURE — 93000 ELECTROCARDIOGRAM COMPLETE: CPT | Performed by: NURSE PRACTITIONER

## 2019-08-13 ENCOUNTER — ANESTHESIA EVENT (OUTPATIENT)
Dept: ENDOSCOPY | Age: 54
End: 2019-08-13
Payer: MEDICAID

## 2019-08-13 ASSESSMENT — LIFESTYLE VARIABLES: SMOKING_STATUS: 1

## 2019-08-13 NOTE — ANESTHESIA PRE PROCEDURE
Smoking status: Current Every Day Smoker     Packs/day: 0.50     Years: 30.00     Pack years: 15.00     Types: Cigarettes    Smokeless tobacco: Never Used   Substance Use Topics    Alcohol use: Yes     Comment: 2 beers a week or less     Vital Signs (Current):    BP: 143/82 Pulse: 60   Resp: 16 SpO2: 100   Temp: 97.3 °F (36.3 °C)   Height: 5' 1\" (1.549 m)  (08/14/19) Weight: 88 lb (39.9 kg)  (08/14/19)   BMI: 16.7           BP Readings from Last 3 Encounters:   08/08/19 100/60   08/05/19 122/68   07/16/19 106/60     NPO Status:> 8 hrs    CBC:    WBC 5.8 07/08/2019    HGB 13.6 07/08/2019    HCT 40.6 07/08/2019     07/08/2019     CMP:     07/08/2019    K 4.0 07/08/2019     07/08/2019    CO2 21 07/08/2019    BUN 9 07/08/2019    CREATININE <0.5 07/08/2019    GLUCOSE 126 07/08/2019    PROT 6.5 07/08/2019    CALCIUM 8.9 07/08/2019    BILITOT <0.2 07/08/2019    ALKPHOS 120 07/08/2019    AST 26 07/08/2019    ALT 25 07/08/2019     Anesthesia Evaluation  Patient summary reviewed and Nursing notes reviewed  Airway: Mallampati: II  TM distance: >3 FB   Neck ROM: full  Mouth opening: > = 3 FB Dental:          Pulmonary:   (+) sleep apnea:  current smoker    (-) COPD and asthma                           Cardiovascular:  Exercise tolerance: good (>4 METS),   (+) dysrhythmias: atrial fibrillation,       ECG reviewed      Echocardiogram reviewed                  Neuro/Psych:   (+) psychiatric history:   (-) seizures, TIA and CVA            ROS comment: Bipolar D/O GI/Hepatic/Renal:   (+) PUD,      (-) no renal disease       Endo/Other:    (+) hypothyroidism::., .    (-) diabetes mellitus, hyperthyroidism               Abdominal:           Vascular:     - DVT and PE. Anesthesia Plan      general     ASA 3       Induction: intravenous. Anesthetic plan and risks discussed with patient. Plan discussed with CRNA.             Wilfrido Funes MD

## 2019-08-14 ENCOUNTER — HOSPITAL ENCOUNTER (OUTPATIENT)
Age: 54
Setting detail: OUTPATIENT SURGERY
Discharge: HOME | End: 2019-08-14
Attending: INTERNAL MEDICINE | Admitting: INTERNAL MEDICINE
Payer: MEDICAID

## 2019-08-14 ENCOUNTER — ANESTHESIA (OUTPATIENT)
Dept: ENDOSCOPY | Age: 54
End: 2019-08-14
Payer: MEDICAID

## 2019-08-14 VITALS
BODY MASS INDEX: 16.62 KG/M2 | RESPIRATION RATE: 18 BRPM | OXYGEN SATURATION: 100 % | HEIGHT: 61 IN | DIASTOLIC BLOOD PRESSURE: 60 MMHG | TEMPERATURE: 97.3 F | HEART RATE: 56 BPM | WEIGHT: 88 LBS | SYSTOLIC BLOOD PRESSURE: 113 MMHG

## 2019-08-14 VITALS — OXYGEN SATURATION: 100 % | SYSTOLIC BLOOD PRESSURE: 108 MMHG | DIASTOLIC BLOOD PRESSURE: 47 MMHG

## 2019-08-14 PROCEDURE — 3700000000 HC ANESTHESIA ATTENDED CARE: Performed by: INTERNAL MEDICINE

## 2019-08-14 PROCEDURE — 2580000003 HC RX 258: Performed by: ANESTHESIOLOGY

## 2019-08-14 PROCEDURE — 2709999900 HC NON-CHARGEABLE SUPPLY: Performed by: INTERNAL MEDICINE

## 2019-08-14 PROCEDURE — 6360000002 HC RX W HCPCS: Performed by: NURSE ANESTHETIST, CERTIFIED REGISTERED

## 2019-08-14 PROCEDURE — 3609017100 HC EGD: Performed by: INTERNAL MEDICINE

## 2019-08-14 PROCEDURE — 2500000003 HC RX 250 WO HCPCS: Performed by: NURSE ANESTHETIST, CERTIFIED REGISTERED

## 2019-08-14 PROCEDURE — 3609027000 HC COLONOSCOPY: Performed by: INTERNAL MEDICINE

## 2019-08-14 PROCEDURE — 7100000011 HC PHASE II RECOVERY - ADDTL 15 MIN: Performed by: INTERNAL MEDICINE

## 2019-08-14 PROCEDURE — 3700000001 HC ADD 15 MINUTES (ANESTHESIA): Performed by: INTERNAL MEDICINE

## 2019-08-14 PROCEDURE — 7100000010 HC PHASE II RECOVERY - FIRST 15 MIN: Performed by: INTERNAL MEDICINE

## 2019-08-14 RX ORDER — LIDOCAINE HYDROCHLORIDE 20 MG/ML
INJECTION, SOLUTION INFILTRATION; PERINEURAL PRN
Status: DISCONTINUED | OUTPATIENT
Start: 2019-08-14 | End: 2019-08-14 | Stop reason: SDUPTHER

## 2019-08-14 RX ORDER — PROPOFOL 10 MG/ML
INJECTION, EMULSION INTRAVENOUS PRN
Status: DISCONTINUED | OUTPATIENT
Start: 2019-08-14 | End: 2019-08-14 | Stop reason: SDUPTHER

## 2019-08-14 RX ORDER — SODIUM CHLORIDE, SODIUM LACTATE, POTASSIUM CHLORIDE, CALCIUM CHLORIDE 600; 310; 30; 20 MG/100ML; MG/100ML; MG/100ML; MG/100ML
INJECTION, SOLUTION INTRAVENOUS CONTINUOUS
Status: DISCONTINUED | OUTPATIENT
Start: 2019-08-14 | End: 2019-08-14 | Stop reason: HOSPADM

## 2019-08-14 RX ORDER — FAMOTIDINE 20 MG/1
20 TABLET, FILM COATED ORAL 2 TIMES DAILY
Qty: 60 TABLET | Refills: 5 | Status: SHIPPED | OUTPATIENT
Start: 2019-08-14 | End: 2020-04-28 | Stop reason: SDUPTHER

## 2019-08-14 RX ADMIN — SODIUM CHLORIDE, POTASSIUM CHLORIDE, SODIUM LACTATE AND CALCIUM CHLORIDE: 600; 310; 30; 20 INJECTION, SOLUTION INTRAVENOUS at 08:39

## 2019-08-14 RX ADMIN — PROPOFOL 40 MG: 10 INJECTION, EMULSION INTRAVENOUS at 08:54

## 2019-08-14 RX ADMIN — PROPOFOL 30 MG: 10 INJECTION, EMULSION INTRAVENOUS at 09:02

## 2019-08-14 RX ADMIN — LIDOCAINE HYDROCHLORIDE 40 MG: 20 INJECTION, SOLUTION INFILTRATION; PERINEURAL at 08:53

## 2019-08-14 RX ADMIN — PROPOFOL 30 MG: 10 INJECTION, EMULSION INTRAVENOUS at 09:06

## 2019-08-14 RX ADMIN — SODIUM CHLORIDE, POTASSIUM CHLORIDE, SODIUM LACTATE AND CALCIUM CHLORIDE: 600; 310; 30; 20 INJECTION, SOLUTION INTRAVENOUS at 08:51

## 2019-08-14 RX ADMIN — PROPOFOL 20 MG: 10 INJECTION, EMULSION INTRAVENOUS at 08:56

## 2019-08-14 RX ADMIN — PROPOFOL 30 MG: 10 INJECTION, EMULSION INTRAVENOUS at 09:04

## 2019-08-14 ASSESSMENT — PAIN SCALES - GENERAL: PAINLEVEL_OUTOF10: 0

## 2019-08-14 ASSESSMENT — PAIN - FUNCTIONAL ASSESSMENT: PAIN_FUNCTIONAL_ASSESSMENT: 0-10

## 2019-08-14 NOTE — OP NOTE
Esophagogastroduodenoscopy & Colonoscopy Note  Patient:   Adelaide Orellana    YOB: 1965    Facility:   [facility] [Outpatient]   Referring/PCP: Meg Del Cid MD  Procedure:   Esophagogastroduodenoscopy  --diagnostic     Colonoscopy --screening;  Date:     8/14/2019  Endoscopist:  Paty Villatoro MD    Preoperative Diagnosis:  Follow-up gastric ulcer; screening for colon cancer    Postoperative Diagnosis: healed gastric ulcer; normal Colonoscopy    Anesthesia:  Per anesthesia    Estimated blood loss: None    Complications: None    Description of Procedure:  Informed consent was obtained from the patient after explanation of the procedure including indications, description of the procedure,  benefits and possible risks and complications of the procedure, and alternatives. Questions were answered. The patient's history was reviewed and a directed physical examination was performed prior to the procedure. Patient was monitored throughout the procedure with pulse oximetry and periodic assessment of vital signs. Patient was sedated as noted above. The Nursing staff and I performed a time out. With the patient in the left lateral decubitus position, the Olympus videoendoscope was placed in the patient's mouth and under direct visualization passed into the esophagus. The scope was ultimately passed to the second portion of the duodenum. Visualization was performed during both introduction and withdrawal of the endoscope and retroflexed view of the proximal stomach was obtained. Findings[de-identified]   Esophagus: normal. The findings do not support a diagnosis of Lopez's Esophagus.   Stomach: abnormal: scarring consistent with previous and healed gastric ulcer, distal greater curvature  Duodenum: normal    With the patient initially in the left lateral decubitus position, a digital rectal examination was performed and revealed negative

## 2019-08-14 NOTE — H&P
GASTROINTESTINAL ENDOSCOPY N/A 5/30/2019    EGD BIOPSY performed by Reginaldo Guerrero MD at [facility] EXTRACTION       Social:   Social History     Tobacco Use    Smoking status: Current Every Day Smoker     Packs/day: 0.50     Years: 30.00     Pack years: 15.00     Types: Cigarettes    Smokeless tobacco: Never Used   Substance Use Topics    Alcohol use: Yes     Comment: 2 beers a week or less     Family:   Family History   Problem Relation Age of Onset    Lung Cancer Mother     Other Mother         Spina bifida    Heart Attack Father        ROS: Pertinent items are noted in HPI, Past Medical/Surgical History      Physical Exam:  Vital Signs: BP (!) 143/82   Pulse 60   Temp 97.3 °F (36.3 °C) (Temporal)   Resp 16   Ht 5' 1\" (1.549 m)   Wt 88 lb (39.9 kg)   LMP 11/26/2012   SpO2 100%   BMI 16.63 kg/m²    Airway: Mallampati: II (soft palate, uvula, fauces visible)  Pulmonary:Normal  Cardiac:Normal  Abdomen:Normal    Lab Results   Component Value Date    WBC 5.8 07/08/2019    HGB 13.6 07/08/2019    MCV 86.8 07/08/2019     07/08/2019     07/08/2019    K 4.0 07/08/2019     07/08/2019    CO2 21 07/08/2019    BUN 9 07/08/2019    CREATININE <0.5 (L) 07/08/2019    GLUCOSE 126 (H) 07/08/2019    PROT 6.5 07/08/2019    LABALBU 3.6 07/08/2019    CALCIUM 8.9 07/08/2019    AST 26 07/08/2019    ALT 25 07/08/2019    ALKPHOS 120 07/08/2019    BILITOT <0.2 07/08/2019    CHOL 95 05/28/2019    HDL 46 05/28/2019    LDLCALC 38 05/28/2019    TRIG 53 05/28/2019    LABA1C 5.6 07/16/2019    TSHREFLEX <0.01 (L) 05/28/2019         Pre-Procedure Assessment / Plan:  ASA: Class 3 - A patient with severe systemic disease that limits activity but is not incapacitating  Level of Sedation Plan: per anesthesia  Post Procedure plan: Return to same level of care    I assessed the patient and find that the patient is in satisfactory condition to proceed with the planned procedure and

## 2020-04-16 ENCOUNTER — TELEPHONE (OUTPATIENT)
Dept: INTERNAL MEDICINE CLINIC | Age: 55
End: 2020-04-16

## 2020-04-28 ENCOUNTER — HOSPITAL ENCOUNTER (OUTPATIENT)
Age: 55
Discharge: HOME | End: 2020-04-28
Payer: MEDICAID

## 2020-04-28 ENCOUNTER — OFFICE VISIT (OUTPATIENT)
Dept: INTERNAL MEDICINE CLINIC | Age: 55
End: 2020-04-28

## 2020-04-28 VITALS
WEIGHT: 104 LBS | OXYGEN SATURATION: 98 % | TEMPERATURE: 97.8 F | BODY MASS INDEX: 19.63 KG/M2 | SYSTOLIC BLOOD PRESSURE: 141 MMHG | DIASTOLIC BLOOD PRESSURE: 79 MMHG | HEIGHT: 61 IN | HEART RATE: 88 BPM

## 2020-04-28 LAB
A/G RATIO: 1.3 (ref 1.1–2.2)
ALBUMIN SERPL-MCNC: 4.2 G/DL (ref 3.4–5)
ALP BLD-CCNC: 99 U/L (ref 40–129)
ALT SERPL-CCNC: 17 U/L (ref 10–40)
ANION GAP SERPL CALCULATED.3IONS-SCNC: 14 MMOL/L (ref 3–16)
AST SERPL-CCNC: 17 U/L (ref 15–37)
BASOPHILS ABSOLUTE: 0.1 K/UL (ref 0–0.2)
BASOPHILS RELATIVE PERCENT: 0.6 %
BILIRUB SERPL-MCNC: 0.4 MG/DL (ref 0–1)
BUN BLDV-MCNC: 11 MG/DL (ref 7–20)
CALCIUM SERPL-MCNC: 9.8 MG/DL (ref 8.3–10.6)
CHLORIDE BLD-SCNC: 102 MMOL/L (ref 99–110)
CO2: 23 MMOL/L (ref 21–32)
CREAT SERPL-MCNC: <0.5 MG/DL (ref 0.6–1.1)
EOSINOPHILS ABSOLUTE: 0.1 K/UL (ref 0–0.6)
EOSINOPHILS RELATIVE PERCENT: 1.7 %
GFR AFRICAN AMERICAN: >60
GFR NON-AFRICAN AMERICAN: >60
GLOBULIN: 3.2 G/DL
GLUCOSE BLD-MCNC: 107 MG/DL (ref 70–99)
HCT VFR BLD CALC: 48.3 % (ref 36–48)
HEMOGLOBIN: 15.9 G/DL (ref 12–16)
LYMPHOCYTES ABSOLUTE: 2.4 K/UL (ref 1–5.1)
LYMPHOCYTES RELATIVE PERCENT: 27.9 %
MCH RBC QN AUTO: 28.7 PG (ref 26–34)
MCHC RBC AUTO-ENTMCNC: 32.9 G/DL (ref 31–36)
MCV RBC AUTO: 87.2 FL (ref 80–100)
MONOCYTES ABSOLUTE: 0.6 K/UL (ref 0–1.3)
MONOCYTES RELATIVE PERCENT: 7.5 %
NEUTROPHILS ABSOLUTE: 5.3 K/UL (ref 1.7–7.7)
NEUTROPHILS RELATIVE PERCENT: 62.3 %
PDW BLD-RTO: 13.5 % (ref 12.4–15.4)
PLATELET # BLD: 202 K/UL (ref 135–450)
PMV BLD AUTO: 10.9 FL (ref 5–10.5)
POTASSIUM SERPL-SCNC: 4.1 MMOL/L (ref 3.5–5.1)
RBC # BLD: 5.54 M/UL (ref 4–5.2)
SODIUM BLD-SCNC: 139 MMOL/L (ref 136–145)
T4 FREE: 3.6 NG/DL (ref 0.9–1.8)
TOTAL PROTEIN: 7.4 G/DL (ref 6.4–8.2)
TSH REFLEX FT4: <0.01 UIU/ML (ref 0.27–4.2)
WBC # BLD: 8.6 K/UL (ref 4–11)

## 2020-04-28 PROCEDURE — 85025 COMPLETE CBC W/AUTO DIFF WBC: CPT

## 2020-04-28 PROCEDURE — 80053 COMPREHEN METABOLIC PANEL: CPT

## 2020-04-28 PROCEDURE — 84443 ASSAY THYROID STIM HORMONE: CPT

## 2020-04-28 PROCEDURE — 84439 ASSAY OF FREE THYROXINE: CPT

## 2020-04-28 PROCEDURE — 96160 PT-FOCUSED HLTH RISK ASSMT: CPT | Performed by: INTERNAL MEDICINE

## 2020-04-28 PROCEDURE — 99214 OFFICE O/P EST MOD 30 MIN: CPT | Performed by: INTERNAL MEDICINE

## 2020-04-28 PROCEDURE — 36415 COLL VENOUS BLD VENIPUNCTURE: CPT

## 2020-04-28 RX ORDER — FAMOTIDINE 20 MG/1
20 TABLET, FILM COATED ORAL 2 TIMES DAILY
Qty: 60 TABLET | Refills: 5 | Status: SHIPPED | OUTPATIENT
Start: 2020-04-28 | End: 2020-10-19

## 2020-04-28 RX ORDER — DILTIAZEM HYDROCHLORIDE 180 MG/1
180 CAPSULE, COATED, EXTENDED RELEASE ORAL DAILY
Qty: 30 CAPSULE | Refills: 5 | Status: SHIPPED | OUTPATIENT
Start: 2020-04-28 | End: 2020-10-19

## 2020-04-28 RX ORDER — CITALOPRAM 20 MG/1
20 TABLET ORAL DAILY
Qty: 30 TABLET | Refills: 5 | Status: SHIPPED | OUTPATIENT
Start: 2020-04-28 | End: 2020-10-19

## 2020-04-28 RX ORDER — PROPRANOLOL HYDROCHLORIDE 80 MG/1
80 CAPSULE, EXTENDED RELEASE ORAL DAILY
Qty: 30 CAPSULE | Refills: 5 | Status: SHIPPED | OUTPATIENT
Start: 2020-04-28 | End: 2020-05-27 | Stop reason: DRUGHIGH

## 2020-04-28 ASSESSMENT — PATIENT HEALTH QUESTIONNAIRE - PHQ9
9. THOUGHTS THAT YOU WOULD BE BETTER OFF DEAD, OR OF HURTING YOURSELF: 0
3. TROUBLE FALLING OR STAYING ASLEEP: 1
1. LITTLE INTEREST OR PLEASURE IN DOING THINGS: 2
SUM OF ALL RESPONSES TO PHQ QUESTIONS 1-9: 9
10. IF YOU CHECKED OFF ANY PROBLEMS, HOW DIFFICULT HAVE THESE PROBLEMS MADE IT FOR YOU TO DO YOUR WORK, TAKE CARE OF THINGS AT HOME, OR GET ALONG WITH OTHER PEOPLE: 0
7. TROUBLE CONCENTRATING ON THINGS, SUCH AS READING THE NEWSPAPER OR WATCHING TELEVISION: 1
5. POOR APPETITE OR OVEREATING: 1
8. MOVING OR SPEAKING SO SLOWLY THAT OTHER PEOPLE COULD HAVE NOTICED. OR THE OPPOSITE, BEING SO FIGETY OR RESTLESS THAT YOU HAVE BEEN MOVING AROUND A LOT MORE THAN USUAL: 0
SUM OF ALL RESPONSES TO PHQ9 QUESTIONS 1 & 2: 4
SUM OF ALL RESPONSES TO PHQ QUESTIONS 1-9: 9
2. FEELING DOWN, DEPRESSED OR HOPELESS: 2
4. FEELING TIRED OR HAVING LITTLE ENERGY: 1
6. FEELING BAD ABOUT YOURSELF - OR THAT YOU ARE A FAILURE OR HAVE LET YOURSELF OR YOUR FAMILY DOWN: 1

## 2020-04-28 NOTE — PATIENT INSTRUCTIONS
1. Hyperthyroid:   Possible Grave's disease.  We will recheck the TSH and free T4 today. Thyroid ultrasound (June 2019) showed thyroiditis (inflammatioin of the thyroid).  We will check labs before prescribing medication. 2. History of gastric ulcer:   Endoscopy showed healed gastric ulcer and normal Colonoscopy in August 2019. Take famotidine (Pepcid) 20 mg tablet twice per day. 3. Paroxysmal atrial fibrillation:  Start back taking propranolol 80 (Inderal LA) mg daily.  Likely related to hyperthyroid. 4. Migraines:  These should be reduced with propranolol for heart rate.     5. Bipolar I:  Continue taking citalopram (Celexa) 20 mg every morning. 6. Smoker and possible COPD (emphysema):  Spirometry (pulmomony function test) today in normal.  Smoking less.         Follow-up in two weeks

## 2020-04-28 NOTE — PROGRESS NOTES
tablet by mouth 2 times daily (Patient not taking: Reported on 4/28/2020)   ARMOUR THYROID PO Take by mouth 3 times daily   propranolol (INDERAL LA) 80 MG extended release capsule Take 1 capsule by mouth daily (Patient not taking: Reported on 4/28/2020)   diltiazem (CARDIZEM CD) 180 MG extended release capsule Take 1 capsule by mouth daily (Patient not taking: Reported on 4/28/2020)     Lab Results   Component Value Date    WBC 8.6 04/28/2020    HGB 15.9 04/28/2020     04/28/2020    MCV 87.2 04/28/2020     Lab Results   Component Value Date     04/28/2020    K 4.1 04/28/2020     04/28/2020    CO2 23 04/28/2020    BUN 11 04/28/2020    CREATININE <0.5 (L) 04/28/2020    GLUCOSE 107 (H) 04/28/2020     EGD large 5cm gastric antral ulcer. H pylori stool antigen pending     Stomach, ulcer, biopsy (5/20/19)     - Antral and transitional mucosa with reactive change.     - Separate fragments of acute inflammatory exudate and luminal debris.     - No Helicobacter-like organisms identified on H&E-stained sections.     TSH (5/28/19) < 0.01 , (4/29) < 0.01 uIU/mL  Free T4 (5/28/20) 4.3, (6/17/19) 4.4, (4/28/20) 3.6 ng/dL (0.9 - 1.8)  Thyroid Stimulating Immunoglobulin (6/17/19) 386%    Magnesium (5/28) 1.50 (6/17) 2.20 mg/dL  Stool H pylori antigen pending     CT abd/pelvis (5/27) Marked mural thickening of the gastric body compatible with gastritis.  Definite ulceration not identified, but that can be difficult to visualized with CT technique.  Consider direct visualization.  There is fluid-filled distention of small bowel in the left mid upper abdomen, with evidence of mucosal hyperenhancement.  Consequently, component of enteritis is also considered.  There is poor visualization of the proximal celiac and superior mesenteric arteries, which is probably technical in nature.  Correlate with any clinical evidence acute and/or chronic mesenteric ischemia.  Consider further evaluation with CT angiography to

## 2020-05-12 ENCOUNTER — OFFICE VISIT (OUTPATIENT)
Dept: INTERNAL MEDICINE CLINIC | Age: 55
End: 2020-05-12

## 2020-05-12 VITALS
HEIGHT: 61 IN | HEART RATE: 111 BPM | WEIGHT: 104 LBS | SYSTOLIC BLOOD PRESSURE: 118 MMHG | OXYGEN SATURATION: 97 % | DIASTOLIC BLOOD PRESSURE: 78 MMHG | BODY MASS INDEX: 19.63 KG/M2 | TEMPERATURE: 98.6 F

## 2020-05-12 PROCEDURE — 99214 OFFICE O/P EST MOD 30 MIN: CPT | Performed by: INTERNAL MEDICINE

## 2020-05-12 RX ORDER — METHIMAZOLE 5 MG/1
5 TABLET ORAL 3 TIMES DAILY
Qty: 90 TABLET | Refills: 3 | Status: SHIPPED | OUTPATIENT
Start: 2020-05-12 | End: 2020-07-14 | Stop reason: SDUPTHER

## 2020-05-12 RX ORDER — NICOTINE 21 MG/24HR
1 PATCH, TRANSDERMAL 24 HOURS TRANSDERMAL DAILY
Qty: 42 PATCH | Refills: 2 | Status: SHIPPED | OUTPATIENT
Start: 2020-05-12 | End: 2020-07-14

## 2020-05-12 NOTE — PROGRESS NOTES
admission and started back on Adderall at a lower dose of 10 mg BID.  She had been taking 20 mg BID.   Treated with Trileptal for bipolar, dose decreased from 400 mg to 300 mg once per day. She is now off Trileptal and does not want to restart. MEDICATIONS:  citalopram (CELEXA) 20 MG tablet Take 1 tablet by mouth daily   famotidine (PEPCID) 20 MG tablet Take 1 tablet by mouth 2 times daily   propranolol (INDERAL LA) 80 MG extended release capsule Take 1 capsule by mouth daily   dilTIAZem (CARDIZEM CD) 180 MG extended release capsule Take 1 capsule by mouth daily   ARMOUR THYROID PO Take by mouth 3 times daily       Lab Results   Component Value Date    WBC 8.6 04/28/2020    HGB 15.9 04/28/2020     04/28/2020    MCV 87.2 04/28/2020     Lab Results   Component Value Date     04/28/2020    K 4.1 04/28/2020     04/28/2020    CO2 23 04/28/2020    BUN 11 04/28/2020    CREATININE <0.5 (L) 04/28/2020    GLUCOSE 107 (H) 04/28/2020     EGD large 5cm gastric antral ulcer.   H pylori stool antigen pending     Stomach, ulcer, biopsy (5/20/19)     - Antral and transitional mucosa with reactive change.     - Separate fragments of acute inflammatory exudate and luminal debris.     - No Helicobacter-like organisms identified on H&E-stained sections.     TSH (5/28/19) < 0.01 , (4/28) < 0.01 uIU/mL  Free T4 (5/28/20) 4.3, (6/17/19) 4.4, (4/28/20) 3.6 ng/dL (0.9 - 1.8)  Thyroid Stimulating Immunoglobulin (6/17/19) 386%     Magnesium (5/28) 1.50 (6/17) 2.20 mg/dL  Stool H pylori antigen pending     CT abd/pelvis (5/27) Marked mural thickening of the gastric body compatible with gastritis.  Definite ulceration not identified, but that can be difficult to visualized with CT technique.  Consider direct visualization.  There is fluid-filled distention of small bowel in the left mid upper abdomen, with evidence of mucosal hyperenhancement.  Consequently, component of enteritis is also considered. Evita Clock is poor visualization of the proximal celiac and superior mesenteric arteries, which is probably technical in nature.  Correlate with any clinical evidence acute and/or chronic mesenteric ischemia.  Consider further evaluation with CT angiography to better evaluate those and to evaluate for the possibility of any stenosis.     Transthoracic echocardiogram (5/27) Heart rhythm is irregular.  Left ventricular systolic function is normal with ejection fraction estimated at 55%.  No regional wall motion abnormalities.  Normal left ventricular diastolic filling pressure.  Mild mitral regurgitation. Mild tricuspid regurgitation. Systolic pulmonary artery pressure (SPAP) is normal estimated at 31 mmHg (Right atrial pressure of 3 mmHg).  No intracardiac thrombus.     EKG:  Sinus rhythm, HR 78, no ST changes     Thyroid ultrasound (6/19) Inhomogeneous thyroid gland, which may indicate subacute or chronic thyroiditis.  No nodule or mass is identified.     PFT (8/5/19) 2.63 (109%) / 2.87 (93%) = 0.92 c/w normal spirometry     Colonoscopy (8/14/19) Normal colonoscopy to the cecum with no evidence of neoplasia, diverticular disease or mucosal abnormality. OBJECTIVE:    /78   Pulse 111   Temp 98.6 °F (37 °C)   Ht 5' 1.2\" (1.554 m)   Wt 104 lb (47.2 kg)   LMP 11/26/2012   SpO2 97%   BMI 19.52 kg/m²   HEENT:  Oropharynx clear, no lymphadenopathy,   LUNGS:  Clear and without wheezes  HEART:  Regular rhythm, no appreciable murmur  ABD:  Benign, active bowel sounds  EXT:  No edema, pulses palpable  NEURO:  No focal neurologic deficits noted. ASSESSMENT / PLAN:   1. Hyperthyroid:   Possible Grave's disease.  TSH undetectable with free T4 elevated again on April 29.  Thyroid ultrasound (June 2019) showed thyroiditis (inflammatioin of the thyroid).  START back on methizazole 5 mg THREE times per day. We will refer for endocrinology evaluation.   2. History of gastric ulcer:   Endoscopy showed healed gastric ulcer and

## 2020-05-27 ENCOUNTER — VIRTUAL VISIT (OUTPATIENT)
Dept: INTERNAL MEDICINE CLINIC | Age: 55
End: 2020-05-27

## 2020-05-27 PROCEDURE — 99212 OFFICE O/P EST SF 10 MIN: CPT | Performed by: INTERNAL MEDICINE

## 2020-05-27 PROCEDURE — 3017F COLORECTAL CA SCREEN DOC REV: CPT | Performed by: INTERNAL MEDICINE

## 2020-05-27 PROCEDURE — 4004F PT TOBACCO SCREEN RCVD TLK: CPT | Performed by: INTERNAL MEDICINE

## 2020-05-27 PROCEDURE — G8428 CUR MEDS NOT DOCUMENT: HCPCS | Performed by: INTERNAL MEDICINE

## 2020-05-27 PROCEDURE — G2012 BRIEF CHECK IN BY MD/QHP: HCPCS | Performed by: INTERNAL MEDICINE

## 2020-05-27 PROCEDURE — G8420 CALC BMI NORM PARAMETERS: HCPCS | Performed by: INTERNAL MEDICINE

## 2020-05-27 RX ORDER — PROPRANOLOL HYDROCHLORIDE 120 MG/1
120 CAPSULE, EXTENDED RELEASE ORAL DAILY
Qty: 30 CAPSULE | Refills: 3 | Status: SHIPPED | OUTPATIENT
Start: 2020-05-27 | End: 2020-09-22 | Stop reason: SDUPTHER

## 2020-05-27 NOTE — PROGRESS NOTES
Charlotte Bullock is a 53 y.o. female evaluated via telephone on 5/27/2020. Consent:  She and/or health care decision maker is aware that that she may receive a bill for this telephone service, depending on her insurance coverage, and has provided verbal consent to proceed: yes      Documentation:  I communicated with the patient and/or health care decision maker about migraine headaches. Details of this discussion including any medical advice provided: Patient states she has had migraine headaches for a number of years and averages 1-2 headaches per month usually lasting about a day. Most recently she had a migraine that lasted 3 to 4 days and was quite intense. She is on both propranolol and Cardizem for both blood pressure and migraine control. She states that her heart rate is usually fairly fast.  I suggested she increase her propranolol to 120 mg daily and could use a short course of Advil taking it with food when her migraine flares. She will call if she has side effects or the treatment is ineffective. I affirm this is a Patient Initiated Episode with a Patient who has not had a related appointment within my department in the past 7 days or scheduled within the next 24 hours.     Patient identification was verified at the start of the visit: yes    Total Time: 10 minutes    Note: not billable if this call serves to triage the patient into an appointment for the relevant concern      JACQUELINE Lo

## 2020-06-09 ENCOUNTER — VIRTUAL VISIT (OUTPATIENT)
Dept: ENDOCRINOLOGY | Age: 55
End: 2020-06-09
Payer: MEDICAID

## 2020-06-09 PROCEDURE — 99243 OFF/OP CNSLTJ NEW/EST LOW 30: CPT | Performed by: INTERNAL MEDICINE

## 2020-06-09 PROCEDURE — G8427 DOCREV CUR MEDS BY ELIG CLIN: HCPCS | Performed by: INTERNAL MEDICINE

## 2020-06-09 RX ORDER — IBUPROFEN 200 MG
200 TABLET ORAL EVERY 6 HOURS PRN
COMMUNITY
End: 2020-07-14

## 2020-06-09 ASSESSMENT — ENCOUNTER SYMPTOMS
PHOTOPHOBIA: 0
BACK PAIN: 0
COUGH: 0

## 2020-06-09 NOTE — PROGRESS NOTES
Fluence Units: J/cm2 ANESTHESIA performed by Cliff Medina MD at [facility]      cyst removed    UPPER GASTROINTESTINAL ENDOSCOPY N/A 5/30/2019    EGD BIOPSY performed by Brianne Jay MD at [address] ENDOSCOPY N/A 8/14/2019    COLON & EGD W/ ANESTHESIA performed by Cliff Medina MD at [facility] EXTRACTION       Family History   Problem Relation Age of Onset    Lung Cancer Mother     Other Mother         Spina bifida    Heart Attack Father      Social History     Tobacco Use   Smoking Status Current Every Day Smoker    Packs/day: 0.50    Years: 30.00    Pack years: 15.00    Types: Cigarettes   Smokeless Tobacco Never Used      Social History     Substance and Sexual Activity   Alcohol Use Yes    Comment: 2 beers a week or less       HPI    Susie Gurrola is a 63 y.o. female who was seen for initial evaluation for management of hyperthyroidism  Patient is being seen at the request of Annelle Landau, MD     Due to the COVID-19 restrictions on close contact interactions the patient's visit was conducted via video link  ( [email or web address] me) in lieu of a face to face visit. Location for patient : home  Physician : home    Pursuant to the emergency declaration under the [facility], [address] [place] [address] authority and the [employer] Act, this Virtual  Visit was conducted, with patient's consent, to reduce the patient's risk of exposure to COVID-19 and provide necessary care. Because this was a Virtual Visit, evaluation of the following organ systems was limited: Vitals, Constitutional, EENT, Resp, CV, GI, , MS, Neuro, Skin. Heme. Lymph, Imm. Patient has a PMH of depression, hyperthyroidism. She was initially treated for hypothyroidism in 2019 and was on armour thyroid.        Patient has been experiencing Weight loss  Fatigue, Treatment Number: 1 Detail Level: Zone Tremors, Palpitations, Heat intolerance    Also c/o headaches. Has blurred vision. + smoker. Patient was found to have a low TSH and high FT4 on labs done in 2019 . Was taking [place] thyroid at that point. Thyroid hormone levels stayed high even after stopping armour thyroid. On methimazole 5 mg TID    Has been non-compliant in taking methimazole     Now taking it regularly for a month    Has see some improvement       FH of thyroid disease : No          Review of Systems   Constitutional: Positive for fatigue. Negative for chills, diaphoresis and fever. Eyes: Positive for visual disturbance. Negative for photophobia. Respiratory: Negative for cough. Cardiovascular: Negative for chest pain and palpitations. Endocrine: Positive for heat intolerance. Negative for polydipsia. Genitourinary: Negative for dysuria, flank pain, frequency, hematuria and urgency. Musculoskeletal: Positive for myalgias. Negative for back pain and neck pain. Skin: Negative for rash. Allergic/Immunologic: Negative for environmental allergies. Neurological: Positive for headaches. Negative for dizziness, tremors and seizures. Hematological: Does not bruise/bleed easily. Psychiatric/Behavioral: Negative for hallucinations and suicidal ideas. The patient is not nervous/anxious. Brief exam on video visit  Patient alert,  Awake, oriented. Normal speech  No distress noted  Normal eyes   Normal hearing  Normal  hand movements.        EXAMINATION:   THYROID ULTRASOUND       6/19/2019       COMPARISON:   None.       HISTORY:   ORDERING SYSTEM PROVIDED HISTORY: Hyperthyroidism   TECHNOLOGIST PROVIDED HISTORY:   Reason for exam:->hyperthyroid   Ordering Physician Provided Reason for Exam: hyperthyroidism   Acuity: Unknown   Type of Exam: Initial       FINDINGS:   Right thyroid lobe:  4.9 x 2.0 x 2.3 cm       Left thyroid lobe:  5.3 x 2.0 x 1.9 cm       Isthmus:  0.3 cm       Thyroid Gland:  Thyroid gland is Pulse Duration (In Milliseconds): 15 inhomogeneous.       Nodules: No thyroid nodules are present.       Cervical lymphadenopathy: No abnormal lymph nodes in the imaged portions of   the neck.           Impression   Inhomogeneous thyroid gland, which may indicate subacute or chronic   thyroiditis.       No nodule or mass is identified.                   Assessment/Plan    1. Hyperthyroidism     This is a 63 yrs old female has hyperthyroidism . Etiology is Graves disease. TSI +ve    Thyroid US showed heterogenous parenchyma. Patient was found to have a low TSH and high FT4 on labs done in 2019 . Was taking [place] thyroid at that point which was stopped. On methimazole 5 mg TID    Has been non-compliant in taking methimazole in the past     Labs in 04/20 again showed high thyroid hormone levels,     Now taking methimazole for a month. Has felt better    Will increase the dose of methimazole to 10 mg BID    Repeat labs at next office visit with PCP. 2. Graves eye disease    Advised to quit smoking.    Advised to see ophthalmologist . Treated Area: small area Fluence: 36 Price (Use Numbers Only, No Special Characters Or $): 989 External Cooling Fan Speed: 5 Post-Care Instructions: I reviewed with the patient in detail post-care instructions. Patient should stay away from the sun and wear sun protection until treated areas are fully healed. Render Post Care In The Note?: yes Pre-Procedure Text: The patients skin was cleaned and ultrasound gel applied. Fluence: 38 Consent: Written consent obtained, risks reviewed including but not limited to crusting, scabbing, blistering, scarring, darker or lighter pigmentary change, bruising, and/or incomplete response. Fluence: 26

## 2020-06-10 ENCOUNTER — TELEPHONE (OUTPATIENT)
Dept: ENDOCRINOLOGY | Age: 55
End: 2020-06-10

## 2020-06-16 ENCOUNTER — HOSPITAL ENCOUNTER (OUTPATIENT)
Age: 55
Discharge: HOME | End: 2020-06-16
Payer: MEDICAID

## 2020-06-16 ENCOUNTER — OFFICE VISIT (OUTPATIENT)
Dept: INTERNAL MEDICINE CLINIC | Age: 55
End: 2020-06-16

## 2020-06-16 VITALS
DIASTOLIC BLOOD PRESSURE: 73 MMHG | WEIGHT: 107 LBS | SYSTOLIC BLOOD PRESSURE: 136 MMHG | HEART RATE: 54 BPM | OXYGEN SATURATION: 98 % | TEMPERATURE: 97.2 F | BODY MASS INDEX: 20.09 KG/M2

## 2020-06-16 LAB
ALBUMIN SERPL-MCNC: 4.2 G/DL (ref 3.4–5)
ALP BLD-CCNC: 111 U/L (ref 40–129)
ALT SERPL-CCNC: 20 U/L (ref 10–40)
AST SERPL-CCNC: 16 U/L (ref 15–37)
BILIRUB SERPL-MCNC: 0.5 MG/DL (ref 0–1)
BILIRUBIN DIRECT: <0.2 MG/DL (ref 0–0.3)
BILIRUBIN, INDIRECT: NORMAL MG/DL (ref 0–1)
T4 FREE: 1.6 NG/DL (ref 0.9–1.8)
TOTAL PROTEIN: 7.1 G/DL (ref 6.4–8.2)
TSH SERPL DL<=0.05 MIU/L-ACNC: <0.01 UIU/ML (ref 0.27–4.2)

## 2020-06-16 PROCEDURE — 83520 IMMUNOASSAY QUANT NOS NONAB: CPT

## 2020-06-16 PROCEDURE — 80076 HEPATIC FUNCTION PANEL: CPT

## 2020-06-16 PROCEDURE — 99214 OFFICE O/P EST MOD 30 MIN: CPT | Performed by: INTERNAL MEDICINE

## 2020-06-16 PROCEDURE — 36415 COLL VENOUS BLD VENIPUNCTURE: CPT

## 2020-06-16 PROCEDURE — 84439 ASSAY OF FREE THYROXINE: CPT

## 2020-06-16 PROCEDURE — 84443 ASSAY THYROID STIM HORMONE: CPT

## 2020-06-16 RX ORDER — SUMATRIPTAN 50 MG/1
50 TABLET, FILM COATED ORAL
Qty: 9 TABLET | Refills: 5 | Status: SHIPPED | OUTPATIENT
Start: 2020-06-16 | End: 2020-07-14 | Stop reason: SDUPTHER

## 2020-06-16 NOTE — PATIENT INSTRUCTIONS
1. Hyperthyroid:  Continue methizazole 10 mg twice per day (two 5 tablets) as prescribed by endocrinology. 2. History of gastric ulcer:   Continue taking famotidine (Pepcid) 20 mg tablet twice per day.    3. Paroxysmal atrial fibrillation:  Currently in sinus rhythm.  Continue taking propranolol 120 mg (Inderal LA) mg daily.  Likely related to hyperthyroid.   No anticoagulation per cardiology.    4. Migraines: Bertell Sidney should be reduced with propranolol for heart rate.  Take sumatriptan (Imitrex) 50 mg tablet at the onset of headache or aura (visual disturbance before a migraine). If more than 10 migraines per month we will adjust prevention therapy to likely include topiramate (Topamax). 5. Bipolar I:  Continue taking citalopram (Celexa) 20 mg every morning.     6. Smoking cessation:  Spirometry (pulmomony function test) normal.  We will continue to discuss.         Follow-up in four weeks  LABS: TSH, free T4, thyrotropin receptor antibody

## 2020-06-16 NOTE — PROGRESS NOTES
1. Hyperthyroid:  TSH still suppressed to undetectable with normal free T4. Elevated Thyrotropin Receptor Antibody suggests lack of remission in Grave's hyperthyroidism. Continue methizazole 10 mg three times per day as prescribed by endocrinology. 2. History of gastric ulcer:   Continue taking famotidine (Pepcid) 20 mg tablet twice per day.    3. Paroxysmal atrial fibrillation:  Currently in sinus rhythm.  Continue taking propranolol 120 mg (Inderal LA) mg daily.  Likely related to hyperthyroid.   No anticoagulation per cardiology.    4. Migraines: Cloretta Endow should be reduced with propranolol for heart rate.  Take sumatriptan (Imitrex) 50 mg tablet at the onset of headache or aura (visual disturbance before a migraine). Topiramate (Topamax) would be contraindicated in hyperthyroid. 5. Bipolar I:  Continue taking citalopram (Celexa) 20 mg every morning.     6. Smoking cessation:  Spirometry (pulmomony function test) normal.  We will continue to discuss.         Follow-up in four weeks  LABS: TSH, free T4, thyrotropin receptor antibody

## 2020-06-21 LAB — TSH RECEPTOR AB: 18.51 IU/L

## 2020-07-14 ENCOUNTER — OFFICE VISIT (OUTPATIENT)
Dept: INTERNAL MEDICINE CLINIC | Age: 55
End: 2020-07-14

## 2020-07-14 VITALS
OXYGEN SATURATION: 97 % | TEMPERATURE: 98.6 F | BODY MASS INDEX: 21.9 KG/M2 | DIASTOLIC BLOOD PRESSURE: 78 MMHG | HEART RATE: 62 BPM | HEIGHT: 61 IN | WEIGHT: 116 LBS | SYSTOLIC BLOOD PRESSURE: 157 MMHG

## 2020-07-14 PROCEDURE — 99214 OFFICE O/P EST MOD 30 MIN: CPT | Performed by: INTERNAL MEDICINE

## 2020-07-14 RX ORDER — METHIMAZOLE 10 MG/1
10 TABLET ORAL 2 TIMES DAILY
Qty: 60 TABLET | Refills: 5 | Status: SHIPPED | OUTPATIENT
Start: 2020-07-14

## 2020-07-14 RX ORDER — SUMATRIPTAN 50 MG/1
50 TABLET, FILM COATED ORAL
Qty: 9 TABLET | Refills: 3 | Status: SHIPPED | OUTPATIENT
Start: 2020-07-14 | End: 2020-07-14

## 2020-07-14 NOTE — PROGRESS NOTES
SUBJECTIVE:   Follow up from  6/16 for migraine on propranolol 120 mg daily and taking sumatriptan 4 x/mo. Gained some weight. Smoking < 0.5 ppd. Endocrinology increase methimazole to 10 mg BID, needs new script.   Valproate appears to be working for manic behavior.          History of Present Illness:  Hospitalized May 2019 with new onset arial fibrillation with rapid ventricular response (Echo EF 55% no RWMA) and hyperthyroidism.  Started on methimazole 5 mg TID for presumed Grave's hyperthyroid.  [id number] score 1 (gender)with plans to start DOAC however pt was found to have GI Ulcer on EGD, GI rec to hold anticoagulatoin for 2 weeks.  Continues on pantoprazole 40 mg BID.   No known history of gastritis, dyspepsia, or gastric reflux.  Drinks alcohol only occasionally.  She has taken ibuprofen up to 10 per day for tooth pain.  She was seen by dentist and teeth pulled two years ago. Bell Torres was taking ibuprofen until admission for left sided headaches.  She is unable to see out of her left eye for the past six months.  She has lost weight which she attributes to vomiting.       Ran out of methimazole and all medications \"months ago\".   Her psychiatrist retired.  Significant fatigue which attributes to the thyroid. Bell Torres was seen by cardiology Ayana La GrangeAna on 8/8/19 for paroxysmal atrial fibrillation in the setting of hyperthyroidism and Adderall with [id number] score 1 (gender).  She has been taking [place] Thyroid in the past for unclear reasons.  Continued on propranolol and diltiazem.  No need for DOAC anticoagulation.  She had been taking [place] Thyroid last summer and fall.  TSH remains suppressed and off methimazole.  Painful lump on left thumb for the past month and seems to be enlarging and more noticeable after she hit it last week.   She would like to quit smoking.  She has used nicotine replacement therapy in the past.       Also underweight BMI 15.9 attributed to Adderall and hyperthyroidism, which was stopped at discharge.  She was followed by Dr. Jessica Vance, psychiatry, who has retired. Radha Ellsworth had been taking Adderall for the past five years.   Seen by Dr. Lisa Schneider to [place] Lacey started back on Adderall at a lower dose of 10 mg BID.  She had been taking 20 mg BID.   Treated with Trileptal for bipolar, dose decreased from 400 mg to 300 mg once per day. Radha Ellsworth is now off Trileptal and does not want to restart. MEDICATIONS:  propranolol (INDERAL LA) 120 MG extended release capsule Take 1 capsule by mouth daily   methIMAzole (TAPAZOLE) 10 MG tablet Take 1 tablet by mouth twice per day   citalopram (CELEXA) 20 MG tablet Take 1 tablet by mouth daily   famotidine (PEPCID) 20 MG tablet Take 1 tablet by mouth 2 times daily   dilTIAZem (CARDIZEM CD) 180 MG extended release capsule Take 1 capsule by mouth daily   SUMAtriptan (IMITREX) 50 MG tablet Take 1 tablet by mouth once as needed for Migraine   ibuprofen (ADVIL) 200 MG tablet Take 200 mg by mouth every 6 hours as needed for Pain   nicotine (NICODERM CQ) 21 MG/24HR Place 1 patch onto the skin daily (Patient not taking: Reported on 7/14/2020)         Lab Results   Component Value Date    LABA1C 5.6 07/16/2019     Lab Results   Component Value Date    WBC 8.6 04/28/2020    HGB 15.9 04/28/2020     04/28/2020    MCV 87.2 04/28/2020     Lab Results   Component Value Date     04/28/2020    K 4.1 04/28/2020     04/28/2020    CO2 23 04/28/2020    BUN 11 04/28/2020    CREATININE <0.5 (L) 04/28/2020    GLUCOSE 107 (H) 04/28/2020       OBJECTIVE:    BP (!) 157/78   Pulse 62   Temp 98.6 °F (37 °C)   Ht 5' 1\" (1.549 m)   Wt 116 lb (52.6 kg)   LMP 11/26/2012   SpO2 97%   BMI 21.92 kg/m²   HEENT:  Oropharynx clear, no lymphadenopathy,   LUNGS:  Clear and without wheezes  HEART:  Regular rhythm, no appreciable murmur  ABD:  Benign, active bowel sounds  EXT:  No edema, pulses palpable  NEURO:  No focal neurologic deficits noted.     ASSESSMENT / PLAN: 1. Hyperthyroid:  TSH still suppressed to undetectable with normal free T4. Elevated Thyrotropin Receptor Antibody suggests lack of remission in Grave's hyperthyroidism. Continue methizazole (Tapazole) 10 mg two times per day as prescribed by endocrinology. 2. History of gastric ulcer:   Continue taking famotidine (Pepcid) 20 mg tablet twice per day.      3. Paroxysmal atrial fibrillation:  Currently in sinus rhythm.  Continue taking propranolol 120 mg (Inderal LA) mg daily.  Likely related to hyperthyroid.  Continue taking diltiazem (Cardizem CD) 180 mg capsule daily. No anticoagulation per cardiology.    4. Migraines: Crisostomo Peer should be reduced with propranolol for heart rate.  Continue taking sumatriptan (Imitrex) 50 mg tablet at the onset of headache or aura (visual disturbance before a migraine). Topiramate (Topamax) would be contraindicated in hyperthyroid. 5. Depression and Bipolar I:  Continue taking citalopram (Celexa) 20 mg every morning.     6. Body mass index is now normal at 21.9 kg/m² up from 15.9 in the past.    7. Smoking cessation:  Spirometry (pulmomony function test) normal.  We will continue to discuss.         Follow-up in two months

## 2020-09-16 RX ORDER — METHIMAZOLE 5 MG/1
10 TABLET ORAL 2 TIMES DAILY
Qty: 60 TABLET | Refills: 5 | Status: SHIPPED | OUTPATIENT
Start: 2020-09-16 | End: 2020-09-22

## 2020-09-22 ENCOUNTER — OFFICE VISIT (OUTPATIENT)
Dept: INTERNAL MEDICINE CLINIC | Age: 55
End: 2020-09-22

## 2020-09-22 ENCOUNTER — HOSPITAL ENCOUNTER (OUTPATIENT)
Age: 55
Discharge: HOME | End: 2020-09-22
Payer: MEDICAID

## 2020-09-22 VITALS
OXYGEN SATURATION: 96 % | WEIGHT: 117 LBS | SYSTOLIC BLOOD PRESSURE: 137 MMHG | HEIGHT: 61 IN | TEMPERATURE: 97.5 F | BODY MASS INDEX: 22.09 KG/M2 | HEART RATE: 67 BPM | DIASTOLIC BLOOD PRESSURE: 81 MMHG

## 2020-09-22 LAB — TSH SERPL DL<=0.05 MIU/L-ACNC: 1.35 UIU/ML (ref 0.27–4.2)

## 2020-09-22 PROCEDURE — 99214 OFFICE O/P EST MOD 30 MIN: CPT | Performed by: INTERNAL MEDICINE

## 2020-09-22 PROCEDURE — 84443 ASSAY THYROID STIM HORMONE: CPT

## 2020-09-22 PROCEDURE — 36415 COLL VENOUS BLD VENIPUNCTURE: CPT

## 2020-09-22 RX ORDER — PANTOPRAZOLE SODIUM 40 MG/1
40 TABLET, DELAYED RELEASE ORAL DAILY
Qty: 30 TABLET | Refills: 1 | Status: SHIPPED | OUTPATIENT
Start: 2020-09-22 | End: 2021-03-30

## 2020-09-22 RX ORDER — PROPRANOLOL HYDROCHLORIDE 120 MG/1
120 CAPSULE, EXTENDED RELEASE ORAL DAILY
Qty: 30 CAPSULE | Refills: 3 | Status: SHIPPED | OUTPATIENT
Start: 2020-09-22

## 2020-09-22 RX ORDER — VALPROIC ACID 250 MG/1
250 CAPSULE, LIQUID FILLED ORAL NIGHTLY
Qty: 30 CAPSULE | Refills: 3 | Status: SHIPPED | OUTPATIENT
Start: 2020-09-22 | End: 2021-07-09

## 2020-09-22 NOTE — PATIENT INSTRUCTIONS
1. Hyperthyroid:  Continue methizazole (Tapazole) 10 mg two times per day as prescribed by endocrinology.  Recheck TSH (thyroid level) today. 2. History of gastric ulcer and gastritis:  START taking pantoprazole (Protonix) 40 mg every morning and continue taking famotidine (Pepcid) 20 mg at bedtime. 3. Paroxysmal atrial fibrillation:  Currently in sinus rhythm.  Continue taking propranolol 120 mg (Inderal LA) mg daily.  Continue taking diltiazem (Cardizem CD) 180 mg capsule daily. No anticoagulation per cardiology.    4. Migraines:  This is doing well with propranolol. These should be reduced with propranolol for heart rate.  Continue taking sumatriptan (Imitrex) 50 mg tablet at the onset of headache or aura (visual disturbance before a migraine).  Topiramate (Topamax) would be contraindicated in hyperthyroid.   5. Depression and Bipolar I:  Continue taking citalopram (Celexa) 20 mg every morning.   START taking valproate (Depakene) 250 mg at night. 6. Allergic rhinitis: This is causing post nasal drainage induced cough and sore throat, especially at night. Use Flonase (fluticasone) nasal spray, two sprays in each nostril every night at bedtime and use nasal saline (Ocean spray) before the Flonase at bedtime and several times during the day especially in the morning to keep nasal secretions from drying out.     7. Body mass index is now normal at 22.1 kg/m² up from 15.9 in the past.    8. Smoking cessation:  Spirometry (pulmomony function test) normal.  We will continue to discuss.         Follow-up in a week

## 2020-09-22 NOTE — PROGRESS NOTES
SUBJECTIVE:   Follow up from , episode of upset stomach with gastritis and vomiting for two days over the weekend with subsequent nausea. Similar to when she had sharp epigastric pain associated with ulcer. Fewer migraines with propranolol. Feels like she is having a sinus infection with post-nasal drainage with some nausea. Taking sumatriptan 2-3 x/month. She has been taking citalopram for the past five years which was working but she is now more depressed.   She was previously on Trileptal, Abilify, Xanax, and Adderall.           History of Present Illness:  Hospitalized May 2019 with new onset arial fibrillation with rapid ventricular response (Echo EF 55% no RWMA) and hyperthyroidism.  Started on methimazole 5 mg TID for presumed Grave's hyperthyroid.  [id number] score 1 (gender)with plans to start DOAC however pt was found to have GI Ulcer on EGD, GI rec to hold anticoagulatoin for 2 weeks.  Continues on pantoprazole 40 mg BID.   No known history of gastritis, dyspepsia, or gastric reflux.  Drinks alcohol only occasionally.  She has taken ibuprofen up to 10 per day for tooth pain.  She was seen by dentist and teeth pulled two years ago. Gregory Meeks was taking ibuprofen until admission for left sided headaches.  She is unable to see out of her left eye for the past six months.  She has lost weight which she attributes to vomiting.       Ran out of methimazole and all medications \"months ago\".   Her psychiatrist retired.  Significant fatigue which attributes to the thyroid. Gregory Meeks was seen by cardiology Gia Nelson on 19 for paroxysmal atrial fibrillation in the setting of hyperthyroidism and Adderall with [id number] score 1 (gender).  She has been taking [place] Thyroid in the past for unclear reasons.  Continued on propranolol and diltiazem.  No need for DOAC anticoagulation.  She had been taking [place] Thyroid last summer and fall.  TSH remains suppressed and off methimazole.  Painful lump on left thumb for the past month and seems to be enlarging and more noticeable after she hit it last week. Casey Calderon would like to quit smoking. Vikas Harper has used nicotine replacement therapy in the past.       Also underweight BMI 15.9 attributed to Adderall and hyperthyroidism, which was stopped at discharge.  She was followed by Dr. Megan Vieira, psychiatry, who has retired. Vikas Harper had been taking Adderall for the past five years.   Seen by Dr. Rebecca Brock to Lonell Cam started back on Adderall at a lower dose of 10 mg BID.  She had been taking 20 mg BID.   Treated with Trileptal for bipolar, dose decreased from 400 mg to 300 mg once per day. Vikas Harper is now off Trileptal and does not want to restart.       MEDICATIONS:   Take 2 tablets by mouth 2 times daily   methIMAzole (TAPAZOLE) 10 MG tablet Take 1 tablet by mouth 2 times daily   propranolol (INDERAL LA) 120 MG extended release  Take 1 capsule by mouth daily   citalopram (CELEXA) 20 MG tablet Take 1 tablet by mouth daily   famotidine (PEPCID) 20 MG tablet Take 1 tablet by mouth 2 times daily   dilTIAZem (CARDIZEM CD) 180 MG extended release Take 1 capsule by mouth daily   SUMAtriptan (IMITREX) 50 MG tablet Take 1 tablet by mouth once as needed for Migraine     Lab Results   Component Value Date    WBC 8.6 04/28/2020    HGB 15.9 04/28/2020     04/28/2020    MCV 87.2 04/28/2020     Lab Results   Component Value Date     04/28/2020    K 4.1 04/28/2020     04/28/2020    CO2 23 04/28/2020    BUN 11 04/28/2020    CREATININE <0.5 (L) 04/28/2020    GLUCOSE 107 (H) 04/28/2020     TSH (9/22) 1.35 uIU/mL    OBJECTIVE:    /81   Pulse 67   Temp 97.5 °F (36.4 °C)   Ht 5' 1\" (1.549 m)   Wt 117 lb (53.1 kg)   LMP 11/26/2012   SpO2 96%   BMI 22.11 kg/m²   HEENT:  Oropharynx clear, no lymphadenopathy,   LUNGS:  Clear and without wheezes  HEART:  Regular rhythm, no appreciable murmur  ABD:  Benign, active bowel sounds  EXT:  No edema, pulses palpable  NEURO:  No focal neurologic deficits noted. ASSESSMENT / PLAN:   1. Hyperthyroid:  Continue methizazole (Tapazole) 10 mg two times per day as prescribed by endocrinology.  Recheck TSH (thyroid level) today is NORMAL at 1.35 uIU/mL. 2. History of gastric ulcer and gastritis:  START taking pantoprazole (Protonix) 40 mg every morning and continue taking famotidine (Pepcid) 20 mg at bedtime. 3. Paroxysmal atrial fibrillation:  Currently in sinus rhythm.  Continue taking propranolol 120 mg (Inderal LA) mg daily.  Continue taking diltiazem (Cardizem CD) 180 mg capsule daily. No anticoagulation per cardiology.    4. Migraines:  This is doing well with propranolol. These should be reduced with propranolol for heart rate.  Continue taking sumatriptan (Imitrex) 50 mg tablet at the onset of headache or aura (visual disturbance before a migraine).  Topiramate (Topamax) would be contraindicated in hyperthyroid.   5. Depression and Bipolar I:  Continue taking citalopram (Celexa) 20 mg every morning.   START taking valproate (Depakene) 250 mg at night. 6. Allergic rhinitis: This is causing post nasal drainage induced cough and sore throat, especially at night. Use Flonase (fluticasone) nasal spray, two sprays in each nostril every night at bedtime and use nasal saline (Ocean spray) before the Flonase at bedtime and several times during the day especially in the morning to keep nasal secretions from drying out.     7. Body mass index is now normal at 22.1 kg/m² up from 15.9 in the past.    8. Smoking cessation:  Spirometry (pulmomony function test) normal.  We will continue to discuss.         Follow-up in a week

## 2020-09-29 ENCOUNTER — OFFICE VISIT (OUTPATIENT)
Dept: INTERNAL MEDICINE CLINIC | Age: 55
End: 2020-09-29

## 2020-09-29 VITALS
DIASTOLIC BLOOD PRESSURE: 79 MMHG | WEIGHT: 117 LBS | HEART RATE: 75 BPM | HEIGHT: 61 IN | TEMPERATURE: 97.3 F | BODY MASS INDEX: 22.09 KG/M2 | OXYGEN SATURATION: 96 % | SYSTOLIC BLOOD PRESSURE: 129 MMHG

## 2020-09-29 PROCEDURE — 99214 OFFICE O/P EST MOD 30 MIN: CPT | Performed by: INTERNAL MEDICINE

## 2020-09-29 NOTE — PROGRESS NOTES
SUBJECTIVE:    Follow up from 9/22. Epigastric pain relieved with the addition to Protonix in the morning, still taking Pepcid in the evening. Did not fill Depakene until Thursday. Some daytime fatigue but otherwise no difference yet. Using Flonase at bedtime. Some cough. Fewer migraines with propranolol. Feels like she is having a sinus infection with post-nasal drainage with some nausea. Taking sumatriptan 2-3 x/month. She has been taking citalopram for the past five years which was working but she is now more depressed.   She was previously on Trileptal, Abilify, Xanax, and Adderall.        History of Present Illness:  Hospitalized May 2019 with new onset arial fibrillation with rapid ventricular response (Echo EF 55% no RWMA) and hyperthyroidism.  Started on methimazole 5 mg TID for presumed Grave's hyperthyroid.  [id number] score 1 (gender)with plans to start DOAC however pt was found to have GI Ulcer on EGD, GI rec to hold anticoagulatoin for 2 weeks.  Continues on pantoprazole 40 mg BID.   No known history of gastritis, dyspepsia, or gastric reflux.  Drinks alcohol only occasionally.  She has taken ibuprofen up to 10 per day for tooth pain.  She was seen by dentist and teeth pulled two years ago. Melissa Miranda was taking ibuprofen until admission for left sided headaches.  She is unable to see out of her left eye for the past six months.  She has lost weight which she attributes to vomiting.       Ran out of methimazole and all medications \"months ago\".   Her psychiatrist retired.  Significant fatigue which attributes to the thyroid. Melissa Miranda was seen by cardiology Kavya Snow on 8/8/19 for paroxysmal atrial fibrillation in the setting of hyperthyroidism and Adderall with [id number] score 1 (gender).  She has been taking [facility] in the past for unclear reasons.  Continued on propranolol and diltiazem.  No need for DOAC anticoagulation.  She had been taking [facility] last summer and fall.  TSH remains suppressed and off methimazole.  Painful lump on left thumb for the past month and seems to be enlarging and more noticeable after she hit it last week.   She would like to quit smoking.  She has used nicotine replacement therapy in the past.       Also underweight BMI 15.9 attributed to Adderall and hyperthyroidism, which was stopped at discharge.  She was followed by Dr. Patricia Birch, psychiatry, who has retired. Camila Esparza had been taking Adderall for the past five years.   Seen by Dr. Nia Lee to [facility] started back on Adderall at a lower dose of 10 mg BID.  She had been taking 20 mg BID.   Treated with Trileptal for bipolar, dose decreased from 400 mg to 300 mg once per day. Camila Esparza is now off Trileptal and does not want to restart    MEDICATIONS:  propranolol (INDERAL LA) 120 MG capsule Take 1 capsule by mouth daily   valproic acid (DEPAKENE) 250 MG capsule Take 1 capsule by mouth nightly   pantoprazole (PROTONIX) 40 MG tablet Take 1 tablet by mouth daily   methIMAzole (TAPAZOLE) 10 MG tablet Take 1 tablet by mouth 2 times daily   citalopram (CELEXA) 20 MG tablet Take 1 tablet by mouth daily   famotidine (PEPCID) 20 MG tablet Take 1 tablet by mouth 2 times daily   dilTIAZem (CARDIZEM CD) 180 MG capsule Take 1 capsule by mouth daily   SUMAtriptan (IMITREX) 50 MG tablet Take 1 tablet by mouth once as needed for Migraine       Lab Results   Component Value Date    LABA1C 5.6 07/16/2019     Lab Results   Component Value Date    WBC 8.6 04/28/2020    HGB 15.9 04/28/2020     04/28/2020    MCV 87.2 04/28/2020     Lab Results   Component Value Date     04/28/2020    K 4.1 04/28/2020     04/28/2020    CO2 23 04/28/2020    BUN 11 04/28/2020    CREATININE <0.5 (L) 04/28/2020    GLUCOSE 107 (H) 04/28/2020       OBJECTIVE:    /79   Pulse 75   Temp 97.3 °F (36.3 °C)   Ht 5' 1\" (1.549 m)   Wt 117 lb (53.1 kg)   LMP 11/26/2012   SpO2 96%   BMI 22.11 kg/m²   HEENT:  Oropharynx clear, no lymphadenopathy,   LUNGS:  Clear and without wheezes  HEART:  Regular rhythm, no appreciable murmur  ABD:  Benign, active bowel sounds  EXT:  No edema, pulses palpable  NEURO:  No focal neurologic deficits noted. ASSESSMENT / PLAN:   1. Hyperthyroid:  Continue methimazole (Tapazole) 10 mg two times per day.   TSH (thyroid level) is NORMAL at 1.35 uIU/mL. 2. History of gastric ulcer and gastritis:  Continue taking pantoprazole (Protonix) 40 mg every morning and famotidine (Pepcid) 20 mg at bedtime. 3. Paroxysmal atrial fibrillation:  Currently in sinus rhythm.  Continue taking propranolol 120 mg (Inderal LA) mg daily.  Continue taking diltiazem (Cardizem CD) 180 mg capsule daily.  No anticoagulation per cardiology.    4. Migraines:  This is doing well with propranolol. These should be reduced with propranolol for heart rate.  Continue taking sumatriptan (Imitrex) 50 mg tablet at the onset of headache or aura (visual disturbance before a migraine).  Topiramate (Topamax) would be contraindicated in hyperthyroid.   5. Depression and Bipolar I:  Continue taking citalopram (Celexa) 20 mg every morning.   START taking valproate (Depakene) 250 mg at night. 6. Allergic rhinitis:  Continue Flonase (fluticasone) nasal spray, two sprays in each nostril every night at bedtime with nasal saline (Ocean spray).     7. Smoking cessation:  Spirometry (pulmomony function test) normal.  We will continue to discuss.        Follow-up in two weeks

## 2020-09-29 NOTE — PATIENT INSTRUCTIONS
1. Hyperthyroid:  Continue methimazole (Tapazole) 10 mg two times per day.   TSH (thyroid level) is NORMAL at 1.35 uIU/mL. 2. History of gastric ulcer and gastritis:  Continue taking pantoprazole (Protonix) 40 mg every morning and famotidine (Pepcid) 20 mg at bedtime. 3. Paroxysmal atrial fibrillation:  Currently in sinus rhythm.  Continue taking propranolol 120 mg (Inderal LA) mg daily.  Continue taking diltiazem (Cardizem CD) 180 mg capsule daily.  No anticoagulation per cardiology.    4. Migraines:  This is doing well with propranolol. These should be reduced with propranolol for heart rate.  Continue taking sumatriptan (Imitrex) 50 mg tablet at the onset of headache or aura (visual disturbance before a migraine).  Topiramate (Topamax) would be contraindicated in hyperthyroid.   5. Depression and Bipolar I:  Continue taking citalopram (Celexa) 20 mg every morning.   START taking valproate (Depakene) 250 mg at night. 6. Allergic rhinitis:  Continue Flonase (fluticasone) nasal spray, two sprays in each nostril every night at bedtime with nasal saline (Ocean spray).     7. Smoking cessation:  Spirometry (pulmomony function test) normal.  We will continue to discuss.        Follow-up in two weeks

## 2020-10-19 RX ORDER — FAMOTIDINE 20 MG/1
TABLET, FILM COATED ORAL
Qty: 60 TABLET | Refills: 5 | Status: SHIPPED | OUTPATIENT
Start: 2020-10-19 | End: 2021-07-09 | Stop reason: SDUPTHER

## 2020-10-19 RX ORDER — DILTIAZEM HYDROCHLORIDE 180 MG/1
CAPSULE, COATED, EXTENDED RELEASE ORAL
Qty: 30 CAPSULE | Refills: 5 | Status: SHIPPED | OUTPATIENT
Start: 2020-10-19

## 2020-10-19 RX ORDER — CITALOPRAM 20 MG/1
TABLET ORAL
Qty: 30 TABLET | Refills: 5 | Status: SHIPPED | OUTPATIENT
Start: 2020-10-19 | End: 2021-07-09 | Stop reason: SDUPTHER

## 2021-03-30 RX ORDER — PANTOPRAZOLE SODIUM 40 MG/1
TABLET, DELAYED RELEASE ORAL
Qty: 30 TABLET | Refills: 1 | Status: SHIPPED | OUTPATIENT
Start: 2021-03-30 | End: 2021-07-09 | Stop reason: ALTCHOICE

## 2021-07-09 ENCOUNTER — HOSPITAL ENCOUNTER (OUTPATIENT)
Age: 56
Discharge: HOME | End: 2021-07-09
Payer: MEDICAID

## 2021-07-09 ENCOUNTER — OFFICE VISIT (OUTPATIENT)
Dept: INTERNAL MEDICINE CLINIC | Age: 56
End: 2021-07-09

## 2021-07-09 VITALS
BODY MASS INDEX: 22.84 KG/M2 | WEIGHT: 121 LBS | SYSTOLIC BLOOD PRESSURE: 136 MMHG | TEMPERATURE: 98.4 F | DIASTOLIC BLOOD PRESSURE: 87 MMHG | HEIGHT: 61 IN | HEART RATE: 60 BPM | OXYGEN SATURATION: 96 %

## 2021-07-09 DIAGNOSIS — Z01.818 PREOP EXAMINATION: ICD-10-CM

## 2021-07-09 DIAGNOSIS — F31.9 BIPOLAR 1 DISORDER (HCC): ICD-10-CM

## 2021-07-09 DIAGNOSIS — E05.90 HYPERTHYROIDISM: ICD-10-CM

## 2021-07-09 DIAGNOSIS — J43.9 PULMONARY EMPHYSEMA, UNSPECIFIED EMPHYSEMA TYPE (HCC): ICD-10-CM

## 2021-07-09 DIAGNOSIS — I48.0 PAROXYSMAL ATRIAL FIBRILLATION (HCC): ICD-10-CM

## 2021-07-09 DIAGNOSIS — H25.9 SENILE CATARACT OF LEFT EYE, UNSPECIFIED AGE-RELATED CATARACT TYPE: ICD-10-CM

## 2021-07-09 DIAGNOSIS — E05.90 HYPERTHYROIDISM: Primary | ICD-10-CM

## 2021-07-09 LAB
T4 FREE: 0.9 NG/DL (ref 0.9–1.8)
TSH SERPL DL<=0.05 MIU/L-ACNC: 5.18 UIU/ML (ref 0.27–4.2)

## 2021-07-09 PROCEDURE — G8420 CALC BMI NORM PARAMETERS: HCPCS | Performed by: INTERNAL MEDICINE

## 2021-07-09 PROCEDURE — G8926 SPIRO NO PERF OR DOC: HCPCS | Performed by: INTERNAL MEDICINE

## 2021-07-09 PROCEDURE — 36415 COLL VENOUS BLD VENIPUNCTURE: CPT

## 2021-07-09 PROCEDURE — 3023F SPIROM DOC REV: CPT | Performed by: INTERNAL MEDICINE

## 2021-07-09 PROCEDURE — 84443 ASSAY THYROID STIM HORMONE: CPT

## 2021-07-09 PROCEDURE — 4004F PT TOBACCO SCREEN RCVD TLK: CPT | Performed by: INTERNAL MEDICINE

## 2021-07-09 PROCEDURE — 3017F COLORECTAL CA SCREEN DOC REV: CPT | Performed by: INTERNAL MEDICINE

## 2021-07-09 PROCEDURE — G8427 DOCREV CUR MEDS BY ELIG CLIN: HCPCS | Performed by: INTERNAL MEDICINE

## 2021-07-09 PROCEDURE — 99214 OFFICE O/P EST MOD 30 MIN: CPT | Performed by: INTERNAL MEDICINE

## 2021-07-09 PROCEDURE — 84439 ASSAY OF FREE THYROXINE: CPT

## 2021-07-09 RX ORDER — FAMOTIDINE 20 MG/1
TABLET, FILM COATED ORAL
Qty: 60 TABLET | Refills: 5 | Status: SHIPPED | OUTPATIENT
Start: 2021-07-09 | End: 2022-01-31

## 2021-07-09 RX ORDER — CITALOPRAM 20 MG/1
TABLET ORAL
Qty: 30 TABLET | Refills: 5 | Status: SHIPPED | OUTPATIENT
Start: 2021-07-09

## 2021-07-09 NOTE — PATIENT INSTRUCTIONS
1. Hyperthyroid:  Continue methimazole (Tapazole) 10 mg two times per day.   Need to check TSH and Free T4.  TSH (Sept 2020) was NORMAL at 1.35 uIU/mL.    2. History of gastric ulcer and gastritis:  Continue taking famotidine (Pepcid) 20 mg at bedtime.    3. Paroxysmal atrial fibrillation:  Currently in sinus rhythm.  Continue taking propranolol 120 mg (Inderal LA) mg daily.  Continue taking diltiazem (Cardizem CD) 180 mg capsule daily.  No anticoagulation per cardiology.    4. Migraines:  This is doing well with propranolol.  These should be reduced with propranolol for heart rate.  Continue taking sumatriptan (Imitrex) 50 mg tablet at the onset of headache or aura (visual disturbance before a migraine).  Topiramate (Topamax) would be contraindicated in hyperthyroid.  May improve after cataract surgery. 5. Depression and Bipolar I:  Continue taking citalopram (Celexa) 20 mg every morning.    6. Smoking cessation:  Spirometry (pulmomony function test) normal.  We will continue to discuss.    7.  Cleared for cataract surgery, which does not require medical clearance.        Follow-up as needed

## 2021-07-09 NOTE — PROGRESS NOTES
Adderall at a lower dose of 10 mg BID.  She had been taking 20 mg BID.   Treated with Trileptal for bipolar, dose decreased from 400 mg to 300 mg once per day. Vernon Chanel is now off Trileptal and does not want to restart. MEDICATIONS:  dilTIAZem (CARDIZEM CD) 180 MG extended release capsule TAKE 1 CAPSULE BY MOUTH EVERY DAY   famotidine (PEPCID) 20 MG tablet TAKE 1 TABLET BY MOUTH TWICE A DAY   citalopram (CELEXA) 20 MG tablet TAKE 1 TABLET BY MOUTH EVERY DAY   propranolol (INDERAL LA) 120 MG extended release capsule Take 1 capsule by mouth daily   methIMAzole (TAPAZOLE) 10 MG tablet Take 1 tablet by mouth 2 times daily   SUMAtriptan (IMITREX) 50 MG tablet Take 1 tablet by mouth once as needed for Migraine       LABS: 04/28/2020  WBC 8.6   HGB 15.9      MCV 87.2        K 4.1      CO2 23   BUN 11   CREATININE <0.5 (L)   GLUCOSE 107 (H)       TSH (7/9) 5.18 uIU/mL  Free T4 (7/9) 0.9 ng/dL    Thyrotropin receptor Ab (6/16/20) 18.51 IU/L    OBJECTIVE:    /87   Pulse 60 reg  Temp 98.4 °F (36.9 °C)   Ht 5' 1\"  Wt 121 lb (54.9 kg)   LMP 11/26/2012   SpO2 96%   BMI 22.86 kg/m²   HEENT:  Oropharynx clear, no lymphadenopathy,   LUNGS:  Clear and without wheezes  HEART:  Regular rhythm, no appreciable murmur  ABD:  Benign, active bowel sounds  EXT:  No edema, pulses palpable  NEURO:  No focal neurologic deficits noted. ASSESSMENT / PLAN:   1. Hyperthyroid:  Continue methimazole (Tapazole) 10 mg two times per day.  TSH slightly elevated at 5.18 uIU/mL with a normal free T4 at 0.9 ng/mdL. Previous TSH (Sept 2020) was NORMAL at 1.35 uIU/mL.    2. History of gastric ulcer and gastritis:  Continue taking famotidine (Pepcid) 20 mg at bedtime.    3. Paroxysmal atrial fibrillation:  Currently in sinus rhythm.  Continue taking propranolol 120 mg (Inderal LA) mg daily.  Continue taking diltiazem (Cardizem CD) 180 mg capsule daily.  No anticoagulation per cardiology.    4.  Migraines:  This is doing well with propranolol.  These should be reduced with propranolol for heart rate.  Continue taking sumatriptan (Imitrex) 50 mg tablet at the onset of headache or aura (visual disturbance before a migraine).  Topiramate (Topamax) would be contraindicated in hyperthyroid.  May improve after cataract surgery. 5. Depression and Bipolar I:  Continue taking citalopram (Celexa) 20 mg every morning.    6. Smoking cessation:  Spirometry (pulmomony function test) normal.  We will continue to discuss.    7.  Cleared for cataract surgery, which does not require medical clearance.        Follow-up as needed

## 2021-07-11 RX ORDER — METHIMAZOLE 5 MG/1
TABLET ORAL
Qty: 60 TABLET | Refills: 5 | Status: SHIPPED | OUTPATIENT
Start: 2021-07-11 | End: 2021-12-06

## 2021-12-06 RX ORDER — METHIMAZOLE 5 MG/1
TABLET ORAL
Qty: 60 TABLET | Refills: 5 | Status: SHIPPED | OUTPATIENT
Start: 2021-12-06

## 2022-01-31 RX ORDER — FAMOTIDINE 20 MG/1
TABLET, FILM COATED ORAL
Qty: 60 TABLET | Refills: 5 | Status: SHIPPED | OUTPATIENT
Start: 2022-01-31

## (undated) DEVICE — NEEDLE 25GAX5.0MM INJ CARR LOCKE

## (undated) DEVICE — AIRLIFE™ NASAL OXYGEN CANNULA CURVED, FLARED TIP, WITH 7 FEET (2.1 M) CRUSH RESISTANT TUBING, OVER-THE-EAR STYLE: Brand: AIRLIFE™

## (undated) DEVICE — ELECTRODE ECG MONITR FOAM TEAR DROP ADLT RED

## (undated) DEVICE — Z DISCONTINUED USE 2276105 GOWN PROTCT UNIV CHST W28IN L49IN SL 24IN BLU SPUNBOND FLM

## (undated) DEVICE — FORCEPS BX L240CM DIA2.4MM L NDL RAD JAW 4 133340

## (undated) DEVICE — ENDO CARRY-ON PROCEDURE KIT: Brand: ENDO CARRY-ON PROCEDURE KIT

## (undated) DEVICE — Device: Brand: DEFENDO VALVE AND CONNECTOR KIT

## (undated) DEVICE — CONMED SCOPE SAVER BITE BLOCK, 20X27 MM: Brand: SCOPE SAVER